# Patient Record
Sex: MALE | Race: WHITE | NOT HISPANIC OR LATINO | Employment: UNEMPLOYED | URBAN - NONMETROPOLITAN AREA
[De-identification: names, ages, dates, MRNs, and addresses within clinical notes are randomized per-mention and may not be internally consistent; named-entity substitution may affect disease eponyms.]

---

## 2024-02-24 ENCOUNTER — HOSPITAL ENCOUNTER (INPATIENT)
Facility: HOSPITAL | Age: 64
LOS: 4 days | Discharge: HOME HEALTH | DRG: 871 | End: 2024-02-28
Attending: INTERNAL MEDICINE | Admitting: INTERNAL MEDICINE
Payer: MEDICARE

## 2024-02-24 ENCOUNTER — APPOINTMENT (OUTPATIENT)
Dept: RADIOLOGY | Facility: HOSPITAL | Age: 64
DRG: 871 | End: 2024-02-24
Payer: MEDICARE

## 2024-02-24 DIAGNOSIS — J18.9 PNEUMONIA OF BOTH LUNGS DUE TO INFECTIOUS ORGANISM, UNSPECIFIED PART OF LUNG: Primary | ICD-10-CM

## 2024-02-24 DIAGNOSIS — J44.0 CHRONIC OBSTRUCTIVE PULMONARY DISEASE WITH ACUTE LOWER RESPIRATORY INFECTION (MULTI): ICD-10-CM

## 2024-02-24 DIAGNOSIS — R53.1 WEAKNESS: ICD-10-CM

## 2024-02-24 DIAGNOSIS — J21.0 RSV (ACUTE BRONCHIOLITIS DUE TO RESPIRATORY SYNCYTIAL VIRUS): ICD-10-CM

## 2024-02-24 LAB
ALBUMIN SERPL BCP-MCNC: 4 G/DL (ref 3.4–5)
ALP SERPL-CCNC: 62 U/L (ref 33–136)
ALT SERPL W P-5'-P-CCNC: 33 U/L (ref 10–52)
ANION GAP SERPL CALC-SCNC: 15 MMOL/L (ref 10–20)
AST SERPL W P-5'-P-CCNC: 29 U/L (ref 9–39)
BASOPHILS # BLD AUTO: 0.03 X10*3/UL (ref 0–0.1)
BASOPHILS NFR BLD AUTO: 0.3 %
BILIRUB SERPL-MCNC: 0.4 MG/DL (ref 0–1.2)
BUN SERPL-MCNC: 71 MG/DL (ref 6–23)
CALCIUM SERPL-MCNC: 9.5 MG/DL (ref 8.6–10.3)
CHLORIDE SERPL-SCNC: 99 MMOL/L (ref 98–107)
CO2 SERPL-SCNC: 24 MMOL/L (ref 21–32)
CREAT SERPL-MCNC: 2.92 MG/DL (ref 0.5–1.3)
EGFRCR SERPLBLD CKD-EPI 2021: 23 ML/MIN/1.73M*2
EOSINOPHIL # BLD AUTO: 0.05 X10*3/UL (ref 0–0.7)
EOSINOPHIL NFR BLD AUTO: 0.4 %
ERYTHROCYTE [DISTWIDTH] IN BLOOD BY AUTOMATED COUNT: 15.7 % (ref 11.5–14.5)
FLUAV RNA RESP QL NAA+PROBE: NOT DETECTED
FLUBV RNA RESP QL NAA+PROBE: NOT DETECTED
GLUCOSE SERPL-MCNC: 186 MG/DL (ref 74–99)
HCT VFR BLD AUTO: 43.5 % (ref 41–52)
HGB BLD-MCNC: 14 G/DL (ref 13.5–17.5)
IMM GRANULOCYTES # BLD AUTO: 0.05 X10*3/UL (ref 0–0.7)
IMM GRANULOCYTES NFR BLD AUTO: 0.4 % (ref 0–0.9)
LACTATE SERPL-SCNC: 1.8 MMOL/L (ref 0.4–2)
LYMPHOCYTES # BLD AUTO: 1.33 X10*3/UL (ref 1.2–4.8)
LYMPHOCYTES NFR BLD AUTO: 11.3 %
MAGNESIUM SERPL-MCNC: 1.76 MG/DL (ref 1.6–2.4)
MCH RBC QN AUTO: 32.1 PG (ref 26–34)
MCHC RBC AUTO-ENTMCNC: 32.2 G/DL (ref 32–36)
MCV RBC AUTO: 100 FL (ref 80–100)
MONOCYTES # BLD AUTO: 1.04 X10*3/UL (ref 0.1–1)
MONOCYTES NFR BLD AUTO: 8.9 %
MRSA DNA SPEC QL NAA+PROBE: NOT DETECTED
NEUTROPHILS # BLD AUTO: 9.24 X10*3/UL (ref 1.2–7.7)
NEUTROPHILS NFR BLD AUTO: 78.7 %
NRBC BLD-RTO: 0 /100 WBCS (ref 0–0)
PLATELET # BLD AUTO: 183 X10*3/UL (ref 150–450)
POTASSIUM SERPL-SCNC: 4.7 MMOL/L (ref 3.5–5.3)
POTASSIUM SERPL-SCNC: 5.7 MMOL/L (ref 3.5–5.3)
PROT SERPL-MCNC: 7.3 G/DL (ref 6.4–8.2)
RBC # BLD AUTO: 4.36 X10*6/UL (ref 4.5–5.9)
RSV RNA RESP QL NAA+PROBE: DETECTED
SARS-COV-2 RNA RESP QL NAA+PROBE: NOT DETECTED
SODIUM SERPL-SCNC: 132 MMOL/L (ref 136–145)
WBC # BLD AUTO: 11.7 X10*3/UL (ref 4.4–11.3)

## 2024-02-24 PROCEDURE — 94762 N-INVAS EAR/PLS OXIMTRY CONT: CPT | Mod: IPSPLIT

## 2024-02-24 PROCEDURE — 87637 SARSCOV2&INF A&B&RSV AMP PRB: CPT | Performed by: PHYSICIAN ASSISTANT

## 2024-02-24 PROCEDURE — 87640 STAPH A DNA AMP PROBE: CPT | Mod: 59 | Performed by: PHYSICIAN ASSISTANT

## 2024-02-24 PROCEDURE — 96366 THER/PROPH/DIAG IV INF ADDON: CPT

## 2024-02-24 PROCEDURE — 36415 COLL VENOUS BLD VENIPUNCTURE: CPT | Mod: IPSPLIT | Performed by: INTERNAL MEDICINE

## 2024-02-24 PROCEDURE — 2500000004 HC RX 250 GENERAL PHARMACY W/ HCPCS (ALT 636 FOR OP/ED): Performed by: PHYSICIAN ASSISTANT

## 2024-02-24 PROCEDURE — 84132 ASSAY OF SERUM POTASSIUM: CPT | Performed by: PHYSICIAN ASSISTANT

## 2024-02-24 PROCEDURE — 99285 EMERGENCY DEPT VISIT HI MDM: CPT | Mod: 25

## 2024-02-24 PROCEDURE — 96367 TX/PROPH/DG ADDL SEQ IV INF: CPT

## 2024-02-24 PROCEDURE — 2060000001 HC INTERMEDIATE ICU ROOM DAILY: Mod: IPSPLIT

## 2024-02-24 PROCEDURE — 84145 PROCALCITONIN (PCT): CPT | Mod: GENLAB | Performed by: NURSE PRACTITIONER

## 2024-02-24 PROCEDURE — 36415 COLL VENOUS BLD VENIPUNCTURE: CPT | Performed by: PHYSICIAN ASSISTANT

## 2024-02-24 PROCEDURE — 83605 ASSAY OF LACTIC ACID: CPT | Performed by: PHYSICIAN ASSISTANT

## 2024-02-24 PROCEDURE — 80053 COMPREHEN METABOLIC PANEL: CPT | Performed by: PHYSICIAN ASSISTANT

## 2024-02-24 PROCEDURE — 96365 THER/PROPH/DIAG IV INF INIT: CPT | Mod: 59

## 2024-02-24 PROCEDURE — 71250 CT THORAX DX C-: CPT

## 2024-02-24 PROCEDURE — 83735 ASSAY OF MAGNESIUM: CPT | Mod: IPSPLIT | Performed by: INTERNAL MEDICINE

## 2024-02-24 PROCEDURE — 85025 COMPLETE CBC W/AUTO DIFF WBC: CPT | Performed by: PHYSICIAN ASSISTANT

## 2024-02-24 PROCEDURE — 87040 BLOOD CULTURE FOR BACTERIA: CPT | Mod: GENLAB | Performed by: PHYSICIAN ASSISTANT

## 2024-02-24 RX ORDER — PRIMIDONE 50 MG/1
150 TABLET ORAL DAILY
COMMUNITY

## 2024-02-24 RX ORDER — MELOXICAM 15 MG/1
15 TABLET ORAL DAILY
COMMUNITY

## 2024-02-24 RX ORDER — LISINOPRIL 5 MG/1
5 TABLET ORAL DAILY
COMMUNITY

## 2024-02-24 RX ORDER — VANCOMYCIN HYDROCHLORIDE 1 G/200ML
1 INJECTION, SOLUTION INTRAVENOUS ONCE
Status: DISCONTINUED | OUTPATIENT
Start: 2024-02-25 | End: 2024-02-25

## 2024-02-24 RX ORDER — BISOPROLOL FUMARATE 5 MG/1
5 TABLET, FILM COATED ORAL DAILY
COMMUNITY

## 2024-02-24 RX ORDER — PRIMIDONE 50 MG/1
100 TABLET ORAL NIGHTLY
COMMUNITY

## 2024-02-24 RX ORDER — ERGOCALCIFEROL 1.25 MG/1
1.25 CAPSULE ORAL
COMMUNITY

## 2024-02-24 RX ORDER — IPRATROPIUM BROMIDE AND ALBUTEROL SULFATE 2.5; .5 MG/3ML; MG/3ML
3 SOLUTION RESPIRATORY (INHALATION)
Status: DISCONTINUED | OUTPATIENT
Start: 2024-02-25 | End: 2024-02-29 | Stop reason: HOSPADM

## 2024-02-24 RX ORDER — HYDROCODONE BITARTRATE AND ACETAMINOPHEN 5; 325 MG/1; MG/1
1 TABLET ORAL EVERY 6 HOURS PRN
COMMUNITY

## 2024-02-24 RX ORDER — PRIMIDONE 50 MG/1
50 TABLET ORAL DAILY
COMMUNITY

## 2024-02-24 RX ORDER — LOPERAMIDE HYDROCHLORIDE 2 MG/1
2 CAPSULE ORAL EVERY 12 HOURS PRN
COMMUNITY

## 2024-02-24 RX ORDER — ACETAMINOPHEN 325 MG/1
650 TABLET ORAL EVERY 6 HOURS PRN
Status: DISCONTINUED | OUTPATIENT
Start: 2024-02-24 | End: 2024-02-29 | Stop reason: HOSPADM

## 2024-02-24 RX ORDER — INSULIN GLARGINE 100 [IU]/ML
10 INJECTION, SOLUTION SUBCUTANEOUS 2 TIMES DAILY
COMMUNITY

## 2024-02-24 RX ORDER — SODIUM BICARBONATE 650 MG/1
650 TABLET ORAL 2 TIMES DAILY
COMMUNITY

## 2024-02-24 RX ORDER — ACETAMINOPHEN 325 MG/1
975 TABLET ORAL ONCE
Status: COMPLETED | OUTPATIENT
Start: 2024-02-24 | End: 2024-02-24

## 2024-02-24 RX ORDER — INSULIN GLARGINE 100 [IU]/ML
15 INJECTION, SOLUTION SUBCUTANEOUS DAILY
COMMUNITY

## 2024-02-24 RX ORDER — ALBUTEROL SULFATE 0.83 MG/ML
3 SOLUTION RESPIRATORY (INHALATION) EVERY 2 HOUR PRN
Status: DISCONTINUED | OUTPATIENT
Start: 2024-02-24 | End: 2024-02-29 | Stop reason: HOSPADM

## 2024-02-24 RX ORDER — LORATADINE 10 MG/1
10 TABLET ORAL DAILY PRN
COMMUNITY

## 2024-02-24 RX ORDER — SODIUM CHLORIDE 9 MG/ML
100 INJECTION, SOLUTION INTRAVENOUS CONTINUOUS
Status: DISCONTINUED | OUTPATIENT
Start: 2024-02-24 | End: 2024-02-25

## 2024-02-24 RX ORDER — FERROUS SULFATE, DRIED 160(50) MG
2 TABLET, EXTENDED RELEASE ORAL DAILY
COMMUNITY

## 2024-02-24 RX ORDER — VANCOMYCIN HYDROCHLORIDE 1 G/200ML
INJECTION, SOLUTION INTRAVENOUS
Status: DISPENSED
Start: 2024-02-24 | End: 2024-02-25

## 2024-02-24 RX ORDER — ATORVASTATIN CALCIUM 10 MG/1
10 TABLET, FILM COATED ORAL DAILY
COMMUNITY

## 2024-02-24 RX ORDER — IPRATROPIUM BROMIDE AND ALBUTEROL SULFATE 2.5; .5 MG/3ML; MG/3ML
SOLUTION RESPIRATORY (INHALATION)
Status: COMPLETED
Start: 2024-02-24 | End: 2024-02-26

## 2024-02-24 RX ORDER — ALBUTEROL SULFATE 0.83 MG/ML
3 SOLUTION RESPIRATORY (INHALATION)
Status: DISCONTINUED | OUTPATIENT
Start: 2024-02-25 | End: 2024-02-24

## 2024-02-24 RX ORDER — GABAPENTIN 300 MG/1
300 CAPSULE ORAL 2 TIMES DAILY
COMMUNITY

## 2024-02-24 RX ORDER — IPRATROPIUM BROMIDE AND ALBUTEROL SULFATE 2.5; .5 MG/3ML; MG/3ML
3 SOLUTION RESPIRATORY (INHALATION)
Status: DISCONTINUED | OUTPATIENT
Start: 2024-02-25 | End: 2024-02-24

## 2024-02-24 RX ORDER — GUAIFENESIN 600 MG/1
600 TABLET, EXTENDED RELEASE ORAL 2 TIMES DAILY
COMMUNITY

## 2024-02-24 RX ORDER — ACETAMINOPHEN 500 MG
500 TABLET ORAL EVERY 6 HOURS PRN
COMMUNITY

## 2024-02-24 RX ORDER — INSULIN LISPRO 100 [IU]/ML
INJECTION, SUSPENSION SUBCUTANEOUS
COMMUNITY

## 2024-02-24 RX ORDER — ENOXAPARIN SODIUM 100 MG/ML
30 INJECTION SUBCUTANEOUS EVERY 24 HOURS
Status: DISCONTINUED | OUTPATIENT
Start: 2024-02-24 | End: 2024-02-29 | Stop reason: HOSPADM

## 2024-02-24 RX ORDER — TIZANIDINE HYDROCHLORIDE 4 MG/1
4 CAPSULE, GELATIN COATED ORAL EVERY 12 HOURS PRN
COMMUNITY

## 2024-02-24 RX ORDER — ACETAMINOPHEN 325 MG/1
TABLET ORAL
Status: COMPLETED
Start: 2024-02-24 | End: 2024-02-24

## 2024-02-24 RX ORDER — OFLOXACIN 3 MG/ML
5 SOLUTION AURICULAR (OTIC) DAILY PRN
COMMUNITY

## 2024-02-24 RX ORDER — FUROSEMIDE 20 MG/1
20 TABLET ORAL DAILY
COMMUNITY

## 2024-02-24 RX ORDER — PANTOPRAZOLE SODIUM 20 MG/1
20 TABLET, DELAYED RELEASE ORAL
COMMUNITY

## 2024-02-24 RX ADMIN — AZITHROMYCIN MONOHYDRATE 500 MG: 500 INJECTION, POWDER, LYOPHILIZED, FOR SOLUTION INTRAVENOUS at 16:45

## 2024-02-24 RX ADMIN — SODIUM CHLORIDE 1466 ML: 9 INJECTION, SOLUTION INTRAVENOUS at 19:55

## 2024-02-24 RX ADMIN — VANCOMYCIN HYDROCHLORIDE 2 G: 1 INJECTION, POWDER, LYOPHILIZED, FOR SOLUTION INTRAVENOUS at 16:45

## 2024-02-24 RX ADMIN — ACETAMINOPHEN 975 MG: 325 TABLET ORAL at 17:00

## 2024-02-24 RX ADMIN — PIPERACILLIN SODIUM AND TAZOBACTAM SODIUM 4.5 G: 4; .5 INJECTION, SOLUTION INTRAVENOUS at 16:45

## 2024-02-24 RX ADMIN — SODIUM CHLORIDE 1000 ML: 9 INJECTION, SOLUTION INTRAVENOUS at 16:45

## 2024-02-24 ASSESSMENT — COLUMBIA-SUICIDE SEVERITY RATING SCALE - C-SSRS
6. HAVE YOU EVER DONE ANYTHING, STARTED TO DO ANYTHING, OR PREPARED TO DO ANYTHING TO END YOUR LIFE?: NO
2. HAVE YOU ACTUALLY HAD ANY THOUGHTS OF KILLING YOURSELF?: NO
1. IN THE PAST MONTH, HAVE YOU WISHED YOU WERE DEAD OR WISHED YOU COULD GO TO SLEEP AND NOT WAKE UP?: NO

## 2024-02-24 ASSESSMENT — PAIN - FUNCTIONAL ASSESSMENT
PAIN_FUNCTIONAL_ASSESSMENT: 0-10

## 2024-02-24 ASSESSMENT — PAIN SCALES - GENERAL
PAINLEVEL_OUTOF10: 0 - NO PAIN

## 2024-02-24 NOTE — ED PROVIDER NOTES
HPI   Chief Complaint   Patient presents with    Shortness of Breath       History of present illness:  64-year-old male presents to the emergency room for complaints of cough and shortness of breath.  The patient states that he is currently residing at a long-term skilled nursing facility.  He states he has a history of diabetes and hypertension as well as chronic kidney disease.  He states he has no previous heart or lung disease.  He states that he began having a bad cough a few days ago and he states yesterday began developing a fever and was sent to another emergency room and evaluated and sent home.  He states that today he got progressively worse and spoke with nursing staff when they evaluated and they are concerned about his vitals and sent him here to be evaluated for further care and treatment.  He is currently on 3 L nasal cannula oxygen at 95% but does not wear oxygen at his home facility.  Patient states he has no other symptoms at this time and denies any chest pain.  He states he does feel short of breath and feels like he cannot stop coughing.  Upon further questioning he does confirm body aches and chills though.    Social history: Negative for alcohol and drug use.    Review of systems:   Gen.: No weight loss  Eyes: No vision loss, double vision, drainage, eye pain.   ENT: No pharyngitis  Cardiac: No chest pain, palpitations, syncope, near syncope.   Pulmonary: No hemoptysis.   Heme/lymph: No swollen glands, fever, bleeding.   GI: No abdominal pain, change in bowel habits, melena, hematemesis, hematochezia, nausea, vomiting, diarrhea.   : No discharge, dysuria, frequency, urgency, hematuria.   Musculoskeletal: No limb pain, joint pain, joint swelling.   Skin: No rashes.   Review of systems is otherwise negative unless stated above or in history of present illness.      Physical exam:  General: Vitals noted  EENT: No lymphadenopathy  Cardiac: Regular, rate, rhythm, no murmur.   Pulmonary: Lungs  clear bilaterally with good aeration. No adventitious breath sounds.   Abdomen: Soft, nonsurgical. Nontender. No peritoneal signs. Normoactive bowel sounds.   Extremities: No peripheral edema.   Skin: No rash.   Neuro: No focal neurologic deficits      Medical decision making:   Testing:   Treatment:   Reevaluation:   Plan: Home-going.  Discussed differential. Will follow-up with the primary physician in the next 2-3 days. Return if worse. They understand return precautions and discharge instructions. Patient and family/friend/caregiver are in agreement with this plan. 64-year-old male presents to the emergency room for complaints of cough and shortness of breath.  The patient states that he is currently residing at a long-term skilled nursing facility.  He states he has a history of diabetes and hypertension as well as chronic kidney disease.  He states he has no previous heart or lung disease.  He states that he began having a bad cough a few days ago and he states yesterday began developing a fever and was sent to another emergency room and evaluated and sent home.  He states that today he got progressively worse and spoke with nursing staff when they evaluated and they are concerned about his vitals and sent him here to be evaluated for further care and treatment.  He is currently on 3 L nasal cannula oxygen at 95% but does not wear oxygen at his home facility.  Patient states he has no other symptoms at this time and denies any chest pain.  He states he does feel short of breath and feels like he cannot stop coughing.  Upon further questioning he does confirm body aches and chills though. Pulmonary: Lungs clear bilaterally with good aeration. No adventitious breath sounds. Cardiac: Regular, rate, rhythm, no murmur.  I explained to the patient test results my concerns for possible pneumonia and early sepsis.  The patient was agreeable to being admitted at this time for further care and treatment.  He was  admitted to the ICU at this time given his vitals as well as soft blood pressure and my concern for impending septic shock.  The patient states that he is feeling much better at this time and was agreeable to being admitted.  Impression:   1.  Pneumonia  2.  RSV          History provided by:  Patient   used: No                        [place] Coma Scale Score: 15                     Patient History   Past Medical History:   Diagnosis Date    Other specified diabetes mellitus without complications (CMS/HCC)     Diabetes mellitus of other type without complication    Personal history of other diseases of urinary system     History of kidney disease    Sensorineural hearing loss, bilateral 11/09/2021    Asymmetric SNHL (sensorineural hearing loss)     Past Surgical History:   Procedure Laterality Date    CHOLECYSTECTOMY  07/19/2018    Cholecystectomy    EYE SURGERY  07/19/2018    Eye Surgery    MR HEAD ANGIO WO IV CONTRAST  6/13/2016    MR HEAD ANGIO WO IV CONTRAST [facility]PATIENT LEGAC[facility]    MR NECK ANGIO WO IV CONTRAST  6/8/2022    MR NECK ANGIO WO IV CONTRAST 6/8/2022 [facility] ANCILLARY LEGACY    OTHER SURGICAL HISTORY  07/19/2018    Amputation Of Leg Above Knee Mid-Thigh     No family history on file.  Social History     Tobacco Use    Smoking status: Not on file    Smokeless tobacco: Not on file   Substance Use Topics    Alcohol use: Not on file    Drug use: Not on file       Physical Exam   ED Triage Vitals   Temperature Heart Rate Respirations BP   02/24/24 1639 02/24/24 1639 02/24/24 1639 02/24/24 1639   (!) 39.3 °C (102.8 °F) (!) 119 (!) 22 149/82      Pulse Ox Temp src Heart Rate Source Patient Position   02/24/24 1630 -- -- --   (!) 93 %         BP Location FiO2 (%)     -- --             Physical Exam    ED Course & MDM   ED Course as of 02/25/24 1212   Sat Feb 24, 2024   1742 POTASSIUM(!): 5.7 [KA]   2006 EKG taken at 1637 on fibber 24 2024 shows sinus tachycardia 19 left axis deviation no ST  elevation or depression no T wave inversion [JF]      ED Course User Index  [JF] Bayron Rolle PA-C  [KA] Avinash Quintero, DO         Diagnoses as of 02/25/24 1212   Pneumonia of both lungs due to infectious organism, unspecified part of lung       Medical Decision Making      Procedure  Procedures     Bayron Rolle PA-C  02/25/24 1214

## 2024-02-25 PROBLEM — E11.9 TYPE 2 DIABETES MELLITUS (MULTI): Status: ACTIVE | Noted: 2024-02-25

## 2024-02-25 PROBLEM — Z87.19 HISTORY OF GI BLEED: Status: ACTIVE | Noted: 2024-02-25

## 2024-02-25 PROBLEM — J21.0 RSV (ACUTE BRONCHIOLITIS DUE TO RESPIRATORY SYNCYTIAL VIRUS): Status: ACTIVE | Noted: 2024-02-25

## 2024-02-25 PROBLEM — N18.9 CKD (CHRONIC KIDNEY DISEASE): Status: ACTIVE | Noted: 2024-02-25

## 2024-02-25 PROBLEM — G25.0 ESSENTIAL TREMOR: Status: ACTIVE | Noted: 2024-02-25

## 2024-02-25 PROBLEM — J44.9 COPD (CHRONIC OBSTRUCTIVE PULMONARY DISEASE) (MULTI): Status: ACTIVE | Noted: 2024-02-25

## 2024-02-25 PROBLEM — D64.9 ANEMIA: Status: ACTIVE | Noted: 2024-02-25

## 2024-02-25 PROBLEM — M19.90 ARTHRITIS: Status: ACTIVE | Noted: 2024-02-25

## 2024-02-25 PROBLEM — I10 HTN (HYPERTENSION): Status: ACTIVE | Noted: 2024-02-25

## 2024-02-25 PROBLEM — E78.5 DYSLIPIDEMIA: Status: ACTIVE | Noted: 2024-02-25

## 2024-02-25 PROBLEM — G62.9 PERIPHERAL NEUROPATHY: Status: ACTIVE | Noted: 2024-02-25

## 2024-02-25 LAB
AMORPH CRY #/AREA UR COMP ASSIST: NORMAL /HPF
ANION GAP SERPL CALC-SCNC: 14 MMOL/L (ref 10–20)
APPEARANCE UR: CLEAR
BILIRUB UR STRIP.AUTO-MCNC: NEGATIVE MG/DL
BNP SERPL-MCNC: 51 PG/ML (ref 0–99)
BUN SERPL-MCNC: 64 MG/DL (ref 6–23)
CALCIUM SERPL-MCNC: 7.5 MG/DL (ref 8.6–10.3)
CHLORIDE SERPL-SCNC: 104 MMOL/L (ref 98–107)
CO2 SERPL-SCNC: 21 MMOL/L (ref 21–32)
COLOR UR: YELLOW
CREAT SERPL-MCNC: 3.03 MG/DL (ref 0.5–1.3)
EGFRCR SERPLBLD CKD-EPI 2021: 22 ML/MIN/1.73M*2
ERYTHROCYTE [DISTWIDTH] IN BLOOD BY AUTOMATED COUNT: 15.9 % (ref 11.5–14.5)
GLUCOSE BLD MANUAL STRIP-MCNC: 131 MG/DL (ref 74–99)
GLUCOSE BLD MANUAL STRIP-MCNC: 141 MG/DL (ref 74–99)
GLUCOSE BLD MANUAL STRIP-MCNC: 188 MG/DL (ref 74–99)
GLUCOSE BLD MANUAL STRIP-MCNC: 235 MG/DL (ref 74–99)
GLUCOSE BLD MANUAL STRIP-MCNC: 238 MG/DL (ref 74–99)
GLUCOSE SERPL-MCNC: 170 MG/DL (ref 74–99)
GLUCOSE UR STRIP.AUTO-MCNC: NEGATIVE MG/DL
HCT VFR BLD AUTO: 38.1 % (ref 41–52)
HGB BLD-MCNC: 11.9 G/DL (ref 13.5–17.5)
HOLD SPECIMEN: NORMAL
KETONES UR STRIP.AUTO-MCNC: NEGATIVE MG/DL
LACTATE SERPL-SCNC: 0.8 MMOL/L (ref 0.4–2)
LEUKOCYTE ESTERASE UR QL STRIP.AUTO: NEGATIVE
MCH RBC QN AUTO: 31.9 PG (ref 26–34)
MCHC RBC AUTO-ENTMCNC: 31.2 G/DL (ref 32–36)
MCV RBC AUTO: 102 FL (ref 80–100)
NITRITE UR QL STRIP.AUTO: NEGATIVE
NRBC BLD-RTO: 0 /100 WBCS (ref 0–0)
PH UR STRIP.AUTO: 5 [PH]
PLATELET # BLD AUTO: 155 X10*3/UL (ref 150–450)
POTASSIUM SERPL-SCNC: 4.3 MMOL/L (ref 3.5–5.3)
PROCALCITONIN SERPL-MCNC: 1.25 NG/ML
PROT UR STRIP.AUTO-MCNC: ABNORMAL MG/DL
RBC # BLD AUTO: 3.73 X10*6/UL (ref 4.5–5.9)
RBC # UR STRIP.AUTO: ABNORMAL /UL
RBC #/AREA URNS AUTO: NORMAL /HPF
SODIUM SERPL-SCNC: 135 MMOL/L (ref 136–145)
SP GR UR STRIP.AUTO: 1.01
UROBILINOGEN UR STRIP.AUTO-MCNC: <2 MG/DL
VANCOMYCIN SERPL-MCNC: 18.3 UG/ML (ref 5–20)
WBC # BLD AUTO: 8.2 X10*3/UL (ref 4.4–11.3)
WBC #/AREA URNS AUTO: NORMAL /HPF

## 2024-02-25 PROCEDURE — 83880 ASSAY OF NATRIURETIC PEPTIDE: CPT | Mod: IPSPLIT | Performed by: INTERNAL MEDICINE

## 2024-02-25 PROCEDURE — 2500000001 HC RX 250 WO HCPCS SELF ADMINISTERED DRUGS (ALT 637 FOR MEDICARE OP): Mod: IPSPLIT | Performed by: NURSE PRACTITIONER

## 2024-02-25 PROCEDURE — 36415 COLL VENOUS BLD VENIPUNCTURE: CPT | Mod: IPSPLIT | Performed by: NURSE PRACTITIONER

## 2024-02-25 PROCEDURE — 2500000002 HC RX 250 W HCPCS SELF ADMINISTERED DRUGS (ALT 637 FOR MEDICARE OP, ALT 636 FOR OP/ED): Mod: IPSPLIT | Performed by: NURSE PRACTITIONER

## 2024-02-25 PROCEDURE — 83605 ASSAY OF LACTIC ACID: CPT | Mod: IPSPLIT | Performed by: NURSE PRACTITIONER

## 2024-02-25 PROCEDURE — 94640 AIRWAY INHALATION TREATMENT: CPT | Mod: IPSPLIT

## 2024-02-25 PROCEDURE — 87449 NOS EACH ORGANISM AG IA: CPT | Mod: GENLAB | Performed by: NURSE PRACTITIONER

## 2024-02-25 PROCEDURE — 80202 ASSAY OF VANCOMYCIN: CPT | Mod: IPSPLIT | Performed by: NURSE PRACTITIONER

## 2024-02-25 PROCEDURE — 99223 1ST HOSP IP/OBS HIGH 75: CPT | Performed by: NURSE PRACTITIONER

## 2024-02-25 PROCEDURE — 2500000004 HC RX 250 GENERAL PHARMACY W/ HCPCS (ALT 636 FOR OP/ED): Mod: IPSPLIT | Performed by: NURSE PRACTITIONER

## 2024-02-25 PROCEDURE — 85027 COMPLETE CBC AUTOMATED: CPT | Mod: IPSPLIT | Performed by: NURSE PRACTITIONER

## 2024-02-25 PROCEDURE — 1200000002 HC GENERAL ROOM WITH TELEMETRY DAILY: Mod: IPSPLIT

## 2024-02-25 PROCEDURE — 94664 DEMO&/EVAL PT USE INHALER: CPT | Mod: IPSPLIT

## 2024-02-25 PROCEDURE — 82947 ASSAY GLUCOSE BLOOD QUANT: CPT | Mod: IPSPLIT

## 2024-02-25 PROCEDURE — 81001 URINALYSIS AUTO W/SCOPE: CPT | Mod: IPSPLIT | Performed by: PHYSICIAN ASSISTANT

## 2024-02-25 PROCEDURE — 2500000005 HC RX 250 GENERAL PHARMACY W/O HCPCS: Mod: IPSPLIT | Performed by: NURSE PRACTITIONER

## 2024-02-25 PROCEDURE — 82374 ASSAY BLOOD CARBON DIOXIDE: CPT | Mod: IPSPLIT | Performed by: NURSE PRACTITIONER

## 2024-02-25 RX ORDER — PRIMIDONE 50 MG/1
50 TABLET ORAL 4 TIMES DAILY
Status: DISCONTINUED | OUTPATIENT
Start: 2024-02-25 | End: 2024-02-25

## 2024-02-25 RX ORDER — SODIUM BICARBONATE 650 MG/1
650 TABLET ORAL 2 TIMES DAILY
Status: DISCONTINUED | OUTPATIENT
Start: 2024-02-25 | End: 2024-02-29 | Stop reason: HOSPADM

## 2024-02-25 RX ORDER — MELOXICAM 7.5 MG/1
15 TABLET ORAL DAILY PRN
Status: DISCONTINUED | OUTPATIENT
Start: 2024-02-25 | End: 2024-02-29 | Stop reason: HOSPADM

## 2024-02-25 RX ORDER — HYDROCODONE BITARTRATE AND ACETAMINOPHEN 5; 325 MG/1; MG/1
1 TABLET ORAL EVERY 6 HOURS PRN
Status: DISCONTINUED | OUTPATIENT
Start: 2024-02-25 | End: 2024-02-29 | Stop reason: HOSPADM

## 2024-02-25 RX ORDER — DEXTROSE 50 % IN WATER (D50W) INTRAVENOUS SYRINGE
25
Status: DISCONTINUED | OUTPATIENT
Start: 2024-02-25 | End: 2024-02-29 | Stop reason: HOSPADM

## 2024-02-25 RX ORDER — CEFTRIAXONE 1 G/50ML
1 INJECTION, SOLUTION INTRAVENOUS EVERY 24 HOURS
Status: DISCONTINUED | OUTPATIENT
Start: 2024-02-25 | End: 2024-02-27

## 2024-02-25 RX ORDER — DICLOFENAC SODIUM 10 MG/G
4 GEL TOPICAL 2 TIMES DAILY PRN
Status: DISCONTINUED | OUTPATIENT
Start: 2024-02-25 | End: 2024-02-29 | Stop reason: HOSPADM

## 2024-02-25 RX ORDER — PRIMIDONE 50 MG/1
50 TABLET ORAL
Status: DISCONTINUED | OUTPATIENT
Start: 2024-02-26 | End: 2024-02-29 | Stop reason: HOSPADM

## 2024-02-25 RX ORDER — INSULIN LISPRO 100 [IU]/ML
0-10 INJECTION, SOLUTION INTRAVENOUS; SUBCUTANEOUS
Status: DISCONTINUED | OUTPATIENT
Start: 2024-02-25 | End: 2024-02-29 | Stop reason: HOSPADM

## 2024-02-25 RX ORDER — TIZANIDINE 4 MG/1
4 TABLET ORAL EVERY 12 HOURS PRN
Status: DISCONTINUED | OUTPATIENT
Start: 2024-02-25 | End: 2024-02-29 | Stop reason: HOSPADM

## 2024-02-25 RX ORDER — PRIMIDONE 50 MG/1
150 TABLET ORAL DAILY
Status: DISCONTINUED | OUTPATIENT
Start: 2024-02-26 | End: 2024-02-29 | Stop reason: HOSPADM

## 2024-02-25 RX ORDER — FUROSEMIDE 20 MG/1
20 TABLET ORAL DAILY
Status: DISCONTINUED | OUTPATIENT
Start: 2024-02-25 | End: 2024-02-29 | Stop reason: HOSPADM

## 2024-02-25 RX ORDER — LISINOPRIL 2.5 MG/1
5 TABLET ORAL DAILY
Status: DISCONTINUED | OUTPATIENT
Start: 2024-02-25 | End: 2024-02-29 | Stop reason: HOSPADM

## 2024-02-25 RX ORDER — BISOPROLOL FUMARATE 5 MG/1
5 TABLET, FILM COATED ORAL DAILY
Status: DISCONTINUED | OUTPATIENT
Start: 2024-02-25 | End: 2024-02-29 | Stop reason: HOSPADM

## 2024-02-25 RX ORDER — GABAPENTIN 300 MG/1
300 CAPSULE ORAL 2 TIMES DAILY
Status: DISCONTINUED | OUTPATIENT
Start: 2024-02-25 | End: 2024-02-29 | Stop reason: HOSPADM

## 2024-02-25 RX ORDER — ERGOCALCIFEROL 1.25 MG/1
1250 CAPSULE ORAL
Status: DISCONTINUED | OUTPATIENT
Start: 2024-02-25 | End: 2024-02-29 | Stop reason: HOSPADM

## 2024-02-25 RX ORDER — AZITHROMYCIN 250 MG/1
500 TABLET, FILM COATED ORAL
Status: COMPLETED | OUTPATIENT
Start: 2024-02-25 | End: 2024-02-27

## 2024-02-25 RX ORDER — MELOXICAM 7.5 MG/1
15 TABLET ORAL DAILY
Status: DISCONTINUED | OUTPATIENT
Start: 2024-02-25 | End: 2024-02-25

## 2024-02-25 RX ORDER — PRIMIDONE 50 MG/1
100 TABLET ORAL NIGHTLY
Status: DISCONTINUED | OUTPATIENT
Start: 2024-02-25 | End: 2024-02-29 | Stop reason: HOSPADM

## 2024-02-25 RX ORDER — LORATADINE 10 MG/1
10 TABLET ORAL DAILY PRN
Status: DISCONTINUED | OUTPATIENT
Start: 2024-02-25 | End: 2024-02-29 | Stop reason: HOSPADM

## 2024-02-25 RX ORDER — PANTOPRAZOLE SODIUM 20 MG/1
20 TABLET, DELAYED RELEASE ORAL
Status: DISCONTINUED | OUTPATIENT
Start: 2024-02-25 | End: 2024-02-29 | Stop reason: HOSPADM

## 2024-02-25 RX ORDER — DEXTROSE MONOHYDRATE 100 MG/ML
0.3 INJECTION, SOLUTION INTRAVENOUS ONCE AS NEEDED
Status: DISCONTINUED | OUTPATIENT
Start: 2024-02-25 | End: 2024-02-29 | Stop reason: HOSPADM

## 2024-02-25 RX ORDER — INSULIN GLARGINE 100 [IU]/ML
10 INJECTION, SOLUTION SUBCUTANEOUS 2 TIMES DAILY
Status: DISCONTINUED | OUTPATIENT
Start: 2024-02-25 | End: 2024-02-29 | Stop reason: HOSPADM

## 2024-02-25 RX ORDER — ATORVASTATIN CALCIUM 10 MG/1
10 TABLET, FILM COATED ORAL DAILY
Status: DISCONTINUED | OUTPATIENT
Start: 2024-02-25 | End: 2024-02-29 | Stop reason: HOSPADM

## 2024-02-25 RX ORDER — GUAIFENESIN 600 MG/1
600 TABLET, EXTENDED RELEASE ORAL 2 TIMES DAILY
Status: DISCONTINUED | OUTPATIENT
Start: 2024-02-25 | End: 2024-02-29 | Stop reason: HOSPADM

## 2024-02-25 RX ADMIN — ENOXAPARIN SODIUM 30 MG: 30 INJECTION SUBCUTANEOUS at 00:08

## 2024-02-25 RX ADMIN — HYDROCODONE BITARTRATE AND ACETAMINOPHEN 1 TABLET: 5; 325 TABLET ORAL at 06:22

## 2024-02-25 RX ADMIN — PRIMIDONE 100 MG: 50 TABLET ORAL at 20:43

## 2024-02-25 RX ADMIN — IPRATROPIUM BROMIDE AND ALBUTEROL SULFATE 3 ML: 2.5; .5 SOLUTION RESPIRATORY (INHALATION) at 08:41

## 2024-02-25 RX ADMIN — SODIUM BICARBONATE 650 MG TABLET 650 MG: at 20:43

## 2024-02-25 RX ADMIN — INSULIN LISPRO 2 UNITS: 100 INJECTION, SOLUTION INTRAVENOUS; SUBCUTANEOUS at 17:07

## 2024-02-25 RX ADMIN — ACETAMINOPHEN 650 MG: 325 TABLET ORAL at 11:07

## 2024-02-25 RX ADMIN — FUROSEMIDE 20 MG: 20 TABLET ORAL at 09:33

## 2024-02-25 RX ADMIN — SODIUM BICARBONATE 650 MG TABLET 650 MG: at 10:58

## 2024-02-25 RX ADMIN — GUAIFENESIN 600 MG: 600 TABLET, EXTENDED RELEASE ORAL at 20:43

## 2024-02-25 RX ADMIN — INSULIN GLARGINE 10 UNITS: 100 INJECTION, SOLUTION SUBCUTANEOUS at 20:46

## 2024-02-25 RX ADMIN — PIPERACILLIN SODIUM AND TAZOBACTAM SODIUM 3.38 G: 3; .375 INJECTION, SOLUTION INTRAVENOUS at 00:08

## 2024-02-25 RX ADMIN — INSULIN LISPRO 4 UNITS: 100 INJECTION, SOLUTION INTRAVENOUS; SUBCUTANEOUS at 11:26

## 2024-02-25 RX ADMIN — Medication 2 L/MIN: at 09:14

## 2024-02-25 RX ADMIN — MELOXICAM 15 MG: 7.5 TABLET ORAL at 22:40

## 2024-02-25 RX ADMIN — CEFTRIAXONE 1 G: 1 INJECTION, SOLUTION INTRAVENOUS at 09:33

## 2024-02-25 RX ADMIN — GABAPENTIN 300 MG: 300 CAPSULE ORAL at 09:33

## 2024-02-25 RX ADMIN — SODIUM CHLORIDE 100 ML/HR: 9 INJECTION, SOLUTION INTRAVENOUS at 00:08

## 2024-02-25 RX ADMIN — PIPERACILLIN SODIUM AND TAZOBACTAM SODIUM 3.38 G: 3; .375 INJECTION, SOLUTION INTRAVENOUS at 05:13

## 2024-02-25 RX ADMIN — DICLOFENAC SODIUM 4 G: 10 GEL TOPICAL at 10:58

## 2024-02-25 RX ADMIN — ATORVASTATIN CALCIUM 10 MG: 10 TABLET, FILM COATED ORAL at 09:33

## 2024-02-25 RX ADMIN — GABAPENTIN 300 MG: 300 CAPSULE ORAL at 20:43

## 2024-02-25 RX ADMIN — PRIMIDONE 50 MG: 50 TABLET ORAL at 17:07

## 2024-02-25 RX ADMIN — BISOPROLOL FUMARATE 5 MG: 5 TABLET ORAL at 09:33

## 2024-02-25 RX ADMIN — GUAIFENESIN 600 MG: 600 TABLET, EXTENDED RELEASE ORAL at 09:33

## 2024-02-25 RX ADMIN — PANTOPRAZOLE SODIUM 20 MG: 20 TABLET, DELAYED RELEASE ORAL at 06:20

## 2024-02-25 RX ADMIN — PRIMIDONE 50 MG: 50 TABLET ORAL at 13:33

## 2024-02-25 RX ADMIN — IPRATROPIUM BROMIDE AND ALBUTEROL SULFATE 3 ML: 2.5; .5 SOLUTION RESPIRATORY (INHALATION) at 21:02

## 2024-02-25 RX ADMIN — ACETAMINOPHEN 650 MG: 325 TABLET ORAL at 02:33

## 2024-02-25 RX ADMIN — LISINOPRIL 5 MG: 2.5 TABLET ORAL at 09:33

## 2024-02-25 RX ADMIN — PRIMIDONE 50 MG: 50 TABLET ORAL at 06:20

## 2024-02-25 RX ADMIN — AZITHROMYCIN DIHYDRATE 500 MG: 250 TABLET ORAL at 09:33

## 2024-02-25 RX ADMIN — IPRATROPIUM BROMIDE AND ALBUTEROL SULFATE 3 ML: 2.5; .5 SOLUTION RESPIRATORY (INHALATION) at 16:06

## 2024-02-25 RX ADMIN — HYDROCODONE BITARTRATE AND ACETAMINOPHEN 1 TABLET: 5; 325 TABLET ORAL at 22:07

## 2024-02-25 RX ADMIN — ENOXAPARIN SODIUM 30 MG: 30 INJECTION SUBCUTANEOUS at 21:54

## 2024-02-25 RX ADMIN — INSULIN GLARGINE 10 UNITS: 100 INJECTION, SOLUTION SUBCUTANEOUS at 09:33

## 2024-02-25 RX ADMIN — TIZANIDINE 4 MG: 4 TABLET ORAL at 11:32

## 2024-02-25 SDOH — SOCIAL STABILITY: SOCIAL INSECURITY: DO YOU FEEL ANYONE HAS EXPLOITED OR TAKEN ADVANTAGE OF YOU FINANCIALLY OR OF YOUR PERSONAL PROPERTY?: NO

## 2024-02-25 SDOH — SOCIAL STABILITY: SOCIAL INSECURITY: HAVE YOU HAD THOUGHTS OF HARMING ANYONE ELSE?: NO

## 2024-02-25 SDOH — SOCIAL STABILITY: SOCIAL INSECURITY: WERE YOU ABLE TO COMPLETE ALL THE BEHAVIORAL HEALTH SCREENINGS?: YES

## 2024-02-25 SDOH — SOCIAL STABILITY: SOCIAL INSECURITY: ARE THERE ANY APPARENT SIGNS OF INJURIES/BEHAVIORS THAT COULD BE RELATED TO ABUSE/NEGLECT?: NO

## 2024-02-25 SDOH — SOCIAL STABILITY: SOCIAL INSECURITY: ABUSE: ADULT

## 2024-02-25 SDOH — SOCIAL STABILITY: SOCIAL INSECURITY: ARE YOU OR HAVE YOU BEEN THREATENED OR ABUSED PHYSICALLY, EMOTIONALLY, OR SEXUALLY BY ANYONE?: NO

## 2024-02-25 SDOH — SOCIAL STABILITY: SOCIAL INSECURITY: DO YOU FEEL UNSAFE GOING BACK TO THE PLACE WHERE YOU ARE LIVING?: NO

## 2024-02-25 SDOH — SOCIAL STABILITY: SOCIAL INSECURITY: HAS ANYONE EVER THREATENED TO HURT YOUR FAMILY OR YOUR PETS?: NO

## 2024-02-25 SDOH — SOCIAL STABILITY: SOCIAL INSECURITY: DOES ANYONE TRY TO KEEP YOU FROM HAVING/CONTACTING OTHER FRIENDS OR DOING THINGS OUTSIDE YOUR HOME?: NO

## 2024-02-25 ASSESSMENT — ACTIVITIES OF DAILY LIVING (ADL)
BATHING: NEEDS ASSISTANCE
FEEDING YOURSELF: INDEPENDENT
DRESSING YOURSELF: NEEDS ASSISTANCE
TOILETING: NEEDS ASSISTANCE
JUDGMENT_ADEQUATE_SAFELY_COMPLETE_DAILY_ACTIVITIES: YES
HEARING - RIGHT EAR: FUNCTIONAL
PATIENT'S MEMORY ADEQUATE TO SAFELY COMPLETE DAILY ACTIVITIES?: YES
WALKS IN HOME: NEEDS ASSISTANCE
GROOMING: NEEDS ASSISTANCE
LACK_OF_TRANSPORTATION: PATIENT DECLINED
ADEQUATE_TO_COMPLETE_ADL: YES
HEARING - LEFT EAR: FUNCTIONAL

## 2024-02-25 ASSESSMENT — ENCOUNTER SYMPTOMS
HEMATOLOGIC/LYMPHATIC NEGATIVE: 1
APPETITE CHANGE: 1
ALLERGIC/IMMUNOLOGIC NEGATIVE: 1
PSYCHIATRIC NEGATIVE: 1
GASTROINTESTINAL NEGATIVE: 1
EYES NEGATIVE: 1
SHORTNESS OF BREATH: 1
FEVER: 1
CARDIOVASCULAR NEGATIVE: 1
CHILLS: 1
ENDOCRINE NEGATIVE: 1
MUSCULOSKELETAL NEGATIVE: 1
NEUROLOGICAL NEGATIVE: 1
COUGH: 1
FATIGUE: 1

## 2024-02-25 ASSESSMENT — COGNITIVE AND FUNCTIONAL STATUS - GENERAL
EATING MEALS: A LITTLE
STANDING UP FROM CHAIR USING ARMS: A LOT
DRESSING REGULAR LOWER BODY CLOTHING: A LOT
TOILETING: A LITTLE
CLIMB 3 TO 5 STEPS WITH RAILING: TOTAL
DAILY ACTIVITIY SCORE: 15
TURNING FROM BACK TO SIDE WHILE IN FLAT BAD: A LOT
MOVING TO AND FROM BED TO CHAIR: A LOT
DRESSING REGULAR UPPER BODY CLOTHING: A LOT
HELP NEEDED FOR BATHING: A LOT
PERSONAL GROOMING: A LITTLE
PATIENT BASELINE BEDBOUND: NO
MOBILITY SCORE: 11
WALKING IN HOSPITAL ROOM: TOTAL
MOVING FROM LYING ON BACK TO SITTING ON SIDE OF FLAT BED WITH BEDRAILS: A LITTLE

## 2024-02-25 ASSESSMENT — PAIN SCALES - GENERAL
PAINLEVEL_OUTOF10: 6
PAINLEVEL_OUTOF10: 0 - NO PAIN
PAINLEVEL_OUTOF10: 3
PAINLEVEL_OUTOF10: 0 - NO PAIN
PAINLEVEL_OUTOF10: 3
PAINLEVEL_OUTOF10: 0 - NO PAIN
PAINLEVEL_OUTOF10: 0 - NO PAIN
PAINLEVEL_OUTOF10: 3
PAINLEVEL_OUTOF10: 3
PAINLEVEL_OUTOF10: 0 - NO PAIN
PAINLEVEL_OUTOF10: 3
PAINLEVEL_OUTOF10: 0 - NO PAIN
PAINLEVEL_OUTOF10: 0 - NO PAIN

## 2024-02-25 ASSESSMENT — PAIN DESCRIPTION - LOCATION
LOCATION: LEG

## 2024-02-25 ASSESSMENT — PATIENT HEALTH QUESTIONNAIRE - PHQ9
1. LITTLE INTEREST OR PLEASURE IN DOING THINGS: NOT AT ALL
2. FEELING DOWN, DEPRESSED OR HOPELESS: NOT AT ALL
SUM OF ALL RESPONSES TO PHQ9 QUESTIONS 1 & 2: 0

## 2024-02-25 ASSESSMENT — LIFESTYLE VARIABLES
HOW MANY STANDARD DRINKS CONTAINING ALCOHOL DO YOU HAVE ON A TYPICAL DAY: PATIENT DOES NOT DRINK
SKIP TO QUESTIONS 9-10: 1
HOW OFTEN DO YOU HAVE A DRINK CONTAINING ALCOHOL: NEVER
HOW OFTEN DO YOU HAVE 6 OR MORE DRINKS ON ONE OCCASION: NEVER
AUDIT-C TOTAL SCORE: 0
AUDIT-C TOTAL SCORE: 0

## 2024-02-25 ASSESSMENT — PAIN DESCRIPTION - ORIENTATION
ORIENTATION: LEFT

## 2024-02-25 NOTE — CONSULTS
CRITICAL CARE PROGRESS NOTE      Hospital Day # 1    Jarret Bledsoe  Primary Care Physician: Trish Echavarria DO  Primary Pulmonologist:      Subjective/Last 24 Hour Update   Mr. Bledsoe is a 64 old male with a pmhx of DM II who presents from a nursing home for with SOB secondary to RSV   Infection.     Patient when he  on arrival was mid 90s     Lines- PIVS  Tubes - none   Gtts - none       Hospital Course     64-year-old male presents to the emergency room for complaints of cough and shortness of breath. The patient states that he is currently residing at a long-term skilled nursing facility. He states he has a history of diabetes and hypertension as well as chronic kidney disease. He states he has no previous heart or lung disease. He states that he began having a bad cough a few days ago and he states yesterday began developing a fever and was sent to another emergency room and evaluated and sent home. He states that today he got progressively worse and spoke with nursing staff when they evaluated and they are concerned about his vitals and sent him here to be evaluated for further care and treatment. He is currently on 3 L nasal cannula oxygen at 95% but does not wear oxygen at his home facility. Patient states he has no other symptoms at this time and denies any chest pain. He states he does feel short of breath and feels like he cannot stop coughing. Upon further questioning he does confirm body aches and chills though.         Past history: reviewed as below    Past Medical History:   Diagnosis Date    Other specified diabetes mellitus without complications (CMS/[facility])     Diabetes mellitus of other type without complication    Personal history of other diseases of urinary system     History of kidney disease    Sensorineural hearing loss, bilateral 11/09/2021    Asymmetric SNHL (sensorineural hearing loss)     Past Surgical History:   Procedure Laterality Date    CHOLECYSTECTOMY  07/19/2018     Cholecystectomy    EYE SURGERY  2018    Eye Surgery    MR HEAD ANGIO WO IV CONTRAST  2016    MR HEAD ANGIO WO IV CONTRAST [facility]IENT LEGAC[facility]    MR NECK ANGIO WO IV CONTRAST  2022    MR NECK ANGIO WO IV CONTRAST 2022 [facility] ANCILLARY LEGACY    OTHER SURGICAL HISTORY  2018    Amputation Of Leg Above Knee Mid-Thigh     Social History     Socioeconomic History    Marital status:      Spouse name: None    Number of children: None    Years of education: None    Highest education level: None   Occupational History    None   Tobacco Use    Smoking status: Unknown    Smokeless tobacco: None   Substance and Sexual Activity    Alcohol use: None    Drug use: None    Sexual activity: None   Other Topics Concern    None   Social History Narrative    None     Social Determinants of Health     Financial Resource Strain: Patient Declined (2024)    Overall Financial Resource Strain (CARDIA)     Difficulty of Paying Living Expenses: Patient declined   Food Insecurity: Not on file   Transportation Needs: Patient Declined (2024)    PRAPARE - Transportation     Lack of Transportation (Medical): Patient declined     Lack of Transportation (Non-Medical): Patient declined   Physical Activity: Not on file   Stress: Not on file   Social Connections: Not on file   Intimate Partner Violence: Not on file   Housing Stability: Patient Declined (2024)    Housing Stability Vital Sign     Unable to Pay for Housing in the Last Year: Patient declined     Number of Places Lived in the Last Year: 2     Unstable Housing in the Last Year: Patient declined     No family history on file.        Objective         Vitals for last 24 hours Temperature Ins/Outs Weight   Temp:  [36.3 °C (97.3 °F)-39.3 °C (102.8 °F)] 37.3 °C (99.1 °F)  Heart Rate:  [] 109  Resp:  [19-32] 28  BP: ()/(48-83) 122/56  FiO2 (%):  [35 %] 35 % Temp (24hrs), Av.8 °C (100.1 °F), Min:36.3 °C (97.3 °F), Max:39.3 °C (102.8 °F)    "  Intake/Output Summary (Last 24 hours) at 2/25/2024 0241  Last data filed at 2/24/2024 2200  Gross per 24 hour   Intake --   Output 500 ml   Net -500 ml    Wt Readings from Last 7 Encounters:   02/24/24 84.5 kg (186 lb 4.6 oz)   04/25/22 86.2 kg (190 lb)   11/09/21 86.2 kg (190 lb)   09/21/21 86.6 kg (191 lb)   09/15/21 86.5 kg (190 lb 9.6 oz)   08/30/21 86.8 kg (191 lb 6.4 oz)   07/20/21 86.5 kg (190 lb 12.8 oz)    Body mass index is 29.18 kg/m².   Sats Hemodynamics Cumulative I/O Vent Settings   SpO2 Readings from Last 3 Encounters:   02/25/24 94%   04/25/22 98%   09/15/21 99%          [unfilled]   @[email or web address]@     Exam:    Gen: NAD  HEENT: NCAT  CV:tachycardia   Resp: wheezing on the bases   Abd: S, NT, ND  Ext: WWP, no significant peripheral edema noted.  Neuro: No focal deficits identified.      Drains         Medications         Scheduled Meds:enoxaparin, 30 mg, subcutaneous, q24h  ipratropium-albuteroL, 3 mL, nebulization, TID  ipratropium-albuteroL, , ,   piperacillin-tazobactam, 3.375 g, intravenous, q6h  vancomycin, 1 g, intravenous, Once  vancomycin, , ,       Continuous Infusions:sodium chloride 0.9%, 100 mL/hr, Last Rate: 100 mL/hr (02/25/24 0008)          Laboratory Data     ID/Cultures:    Date Result Date  Result   Blood 2/24 Process      Respiratory       Urine       C. Diff       Flu/RSV/COVID 2/24 + RSV      Other    No results found for: \"HIV1X2\"  No results found for: \"KJ4CFYKX\"       Hematology  Results from last 7 days   Lab Units 02/24/24  1650   WBC AUTO x10*3/uL 11.7*   RBC AUTO x10*6/uL 4.36*   HEMOGLOBIN g/dL 14.0   HEMATOCRIT % 43.5   PLATELETS AUTO x10*3/uL 183       Chemistry         Results from last 7 days   Lab Units 02/24/24  1847 02/24/24  1650   SODIUM mmol/L  --  132*   POTASSIUM mmol/L 4.7 5.7*   CHLORIDE mmol/L  --  99   CO2 mmol/L  --  24   BUN mg/dL  --  71*   CREATININE mg/dL  --  2.92*   CALCIUM mg/dL  --  9.5   ALBUMIN g/dL  --  4.0   PROTEIN TOTAL g/dL  --  " "7.3   BILIRUBIN TOTAL mg/dL  --  0.4   ALT U/L  --  33   AST U/L  --  29     Results from last 7 days   Lab Units 02/24/24  1650   LACTATE mmol/L 1.8     No lab exists for component: \"SCVO2\"      Blood Gases        No lab exists for component: \"ERS7CNSCA\", \"IP8KGBTI\", \"BEDEFICIT\"       Assessment/Plan       Acute hypoxic respiratory failure secondary RSV   Acute on chronic kidney   COPD   Hyperkalemia       - will give ICS   - support care for RSV   - patient is on 3 L   - will order a BNP in the am   - likely GGOs are viral vs bacterial   - Will continue to manage and replete critical electrolytes. Will keep  Mg >2.0, K >4.0, and Phos greater than 3.0.  - Will actively target with vasopressors , if needed, to keep MAP > 65.  - Appropriately maintain saturations above 92%.  - Continue to monitor UOP carefully to ensure at least 0.5ml/kg/h.  - Maintain appropriate sleep/wake cycles to avoid ICU delirium.    DVT prophylaxis: lovenox   Code status: Full        The entirety of this visit history, physical exam, and diagnostic interpretation was done via audiovisual telemedicine.     Patient is located in [place] and I am located in [place].   Upon my own and separate evaluation, this patient had a high  probability of imminent of life-threatening deterioration due to acute hypoxic respiratory failure ,  which required my direct attention, intervention and personal  management.    I have personally provided 60 minutes of critical care time exclusive  of time spent on separately billable procedures. Time includes review  of laboratory data, radiology results, discussion with consultants,  and monitoring of potential decompensation. Interventions were  performed as documented above.    Dimas Beauchamp IV, MD  Pulmonary, Critical Care and Sleep Medicine  [facility]      "

## 2024-02-25 NOTE — CARE PLAN
The patient's goals for the shift include  : to have decreased shortness of breath     The clinical goals for the shift include SPO2 will be 92% or greater on supplemental O2    Patient's HR improving throughout shift. Medicated for left leg pain, see MAR. Patient on 3 L NC. Droplet precautions in place.

## 2024-02-25 NOTE — H&P
History Of Present Illness  Jarret Bledsoe is a 64 y.o. male with PMH of diabetes, HTN, CKD who presented to ED 2/24 from SNF with c/o cough, fever, body aches, chills and SOB for a few days. He has been hypoxic as well for the past few days and needed oxygen support, 3 liters NC. On arrival to the ED he was febrile to 39.3 degrees, tachycardic with , and 93% on 3 liters. Further workup showed Na 132, K 5.7, Bun/creat 71/2.92, WBC 11.7. He was also found to be RSV+. CT chest showed basilar ground glass opacities, basilar bronchiectasis and bronchial wall thickening suggesting acute bronchitis. He was given IVF, IV antibiotics were started and he was admitted to Medicine for further evaluation and treatment.     Past Medical History  Past Medical History:   Diagnosis Date    Anemia     Arthritis     Chronic kidney disease     COPD (chronic obstructive pulmonary disease) (CMS/[facility])     Diabetes mellitus (CMS/[facility])     Dyslipidemia     Hypertension     Sensorineural hearing loss, bilateral 11/09/2021    Asymmetric SNHL (sensorineural hearing loss)    Sickle cell anemia (CMS/[facility])      Surgical History  Past Surgical History:   Procedure Laterality Date    AV FISTULA PLACEMENT      CHOLECYSTECTOMY  07/19/2018    Cholecystectomy    EYE SURGERY  07/19/2018    Eye Surgery    HERNIA REPAIR      LEG AMPUTATION THROUGH KNEE Right     MR HEAD ANGIO WO IV CONTRAST  06/13/2016    MR HEAD ANGIO WO IV CONTRAST [facility] INPATIENT LEGACY    MR NECK ANGIO WO IV CONTRAST  06/08/2022    MR NECK ANGIO WO IV CONTRAST 6/8/2022 [facility] ANCILLARY LEGACY      Social History  He has no history on file for tobacco use, alcohol use, and drug use.    Family History  No family history on file.     Allergies  Patient has no known allergies.    Review of Systems   Constitutional:  Positive for appetite change, chills, fatigue and fever.   HENT: Negative.     Eyes: Negative.    Respiratory:  Positive for cough and shortness of breath.    Cardiovascular:  Negative.    Gastrointestinal: Negative.    Endocrine: Negative.    Genitourinary: Negative.    Musculoskeletal: Negative.    Skin: Negative.    Allergic/Immunologic: Negative.    Neurological: Negative.    Hematological: Negative.    Psychiatric/Behavioral: Negative.          Physical Exam  Constitutional:       General: He is not in acute distress.     Appearance: Normal appearance. He is not toxic-appearing.   HENT:      Head: Normocephalic and atraumatic.      Mouth/Throat:      Mouth: Mucous membranes are moist.   Eyes:      Extraocular Movements: Extraocular movements intact.      Pupils: Pupils are equal, round, and reactive to light.   Cardiovascular:      Rate and Rhythm: Normal rate and regular rhythm.      Pulses: Normal pulses.      Heart sounds: Normal heart sounds. No murmur heard.     No gallop.   Pulmonary:      Effort: Pulmonary effort is normal. No respiratory distress.      Breath sounds: Wheezing and rhonchi present. No rales.   Abdominal:      General: Bowel sounds are normal. There is no distension.      Palpations: Abdomen is soft.      Tenderness: There is no abdominal tenderness. There is no guarding.   Musculoskeletal:         General: No swelling or tenderness. Normal range of motion.      Cervical back: Normal range of motion and neck supple.      Left lower leg: No edema.   Skin:     General: Skin is warm and dry.      Capillary Refill: Capillary refill takes less than 2 seconds.      Coloration: Skin is not jaundiced.      Findings: No bruising or rash.   Neurological:      General: No focal deficit present.      Mental Status: He is alert and oriented to person, place, and time.      Cranial Nerves: No cranial nerve deficit.      Sensory: No sensory deficit.      Motor: No weakness.      Gait: Gait normal.   Psychiatric:         Mood and Affect: Mood normal.         Behavior: Behavior normal.         Thought Content: Thought content normal.         Judgment: Judgment normal.       Last  "Recorded Vitals  Blood pressure 100/59, pulse 75, temperature 36 °C (96.8 °F), temperature source Temporal, resp. rate 21, height 1.702 m (5' 7\"), weight 84.3 kg (185 lb 13.6 oz), SpO2 97 %.    Scheduled medications  atorvastatin, 10 mg, oral, Daily  bisoprolol, 5 mg, oral, Daily  enoxaparin, 30 mg, subcutaneous, q24h  ergocalciferol, 1,250 mcg, oral, Weekly  furosemide, 20 mg, oral, Daily  gabapentin, 300 mg, oral, BID  guaiFENesin, 600 mg, oral, BID  insulin glargine, 10 Units, subcutaneous, BID  ipratropium-albuteroL, 3 mL, nebulization, TID  ipratropium-albuteroL, , ,   lisinopril, 5 mg, oral, Daily  pantoprazole, 20 mg, oral, Daily before breakfast  piperacillin-tazobactam, 3.375 g, intravenous, q6h  primidone, 50 mg, oral, 4x daily  vancomycin, 1 g, intravenous, Once    PRN medications  PRN medications: acetaminophen, albuterol, HYDROcodone-acetaminophen, ipratropium-albuteroL, loratadine, meloxicam, oxygen    Relevant Results    CT chest wo IV contrast  Result Date: 2/24/2024  Basilar ground glass opacities superimposed on chronic changes and atelectasis.  Please correlate for component of pneumonia.  Basilar bronchiectasis and bronchial wall thickening suggesting acute bronchitis.  There are subtle pulmonary nodules. Coronary calculations are marker for coronary disease.     XR chest 1 view  Result Date: 2/23/2024  IMPRESSION: No acute radiographic abnormality.      Latest Reference Range & Units 02/25/24 05:17   Lactate 0.4 - 2.0 mmol/L 0.8   BNP 0 - 99 pg/mL 51      Latest Reference Range & Units 02/24/24 16:50 02/24/24 18:47 02/24/24 23:02 02/25/24 05:17   GLUCOSE 74 - 99 mg/dL 186 (H)   170 (H)   SODIUM 136 - 145 mmol/L 132 (L)   135 (L)   POTASSIUM 3.5 - 5.3 mmol/L 5.7 (H) 4.7  4.3   CHLORIDE 98 - 107 mmol/L 99   104   Bicarbonate 21 - 32 mmol/L 24   21   Anion Gap 10 - 20 mmol/L 15   14   Blood Urea Nitrogen 6 - 23 mg/dL 71 (H)   64 (H)   Creatinine 0.50 - 1.30 mg/dL 2.92 (H)   3.03 (H)   EGFR >60 " mL/min/1.73m*2 23 (L)   22 (L)   Calcium 8.6 - 10.3 mg/dL 9.5   7.5 (L)   Albumin 3.4 - 5.0 g/dL 4.0      Alkaline Phosphatase 33 - 136 U/L 62      ALT 10 - 52 U/L 33      AST 9 - 39 U/L 29      Bilirubin Total 0.0 - 1.2 mg/dL 0.4      Total Protein 6.4 - 8.2 g/dL 7.3      MAGNESIUM 1.60 - 2.40 mg/dL   1.76    Lactate 0.4 - 2.0 mmol/L 1.8   0.8   BNP 0 - 99 pg/mL    51      [facility] Reference Range & Units 02/24/24 16:50 02/25/24 05:17   WBC 4.4 - 11.3 x10*3/uL 11.7 (H) 8.2   nRBC 0.0 - 0.0 /100 WBCs 0.0 0.0   RBC 4.50 - 5.90 x10*6/uL 4.36 (L) 3.73 (L)   HEMOGLOBIN 13.5 - 17.5 g/dL 14.0 11.9 (L)   HEMATOCRIT 41.0 - 52.0 % 43.5 38.1 (L)   MCV 80 - 100 fL 100 102 (H)   MCH 26.0 - 34.0 pg 32.1 31.9   MCHC 32.0 - 36.0 g/dL 32.2 31.2 (L)   RED CELL DISTRIBUTION WIDTH 11.5 - 14.5 % 15.7 (H) 15.9 (H)   Platelets 150 - 450 x10*3/uL 183 155      Assessment/Plan   Principal Problem:    Pneumonia of both lungs due to infectious organism, unspecified part of lung  Active Problems:    Anemia    Arthritis    CKD (chronic kidney disease)    COPD (chronic obstructive pulmonary disease) (CMS/[facility])    Dyslipidemia    HTN (hypertension)    History of GI bleed    Type 2 diabetes mellitus (CMS/[facility])    Essential tremor    Peripheral neuropathy    RSV (acute bronchiolitis due to respiratory syncytial virus)    Pneumonia of both lungs due to infectious organism, unspecified part of lung  COPD (chronic obstructive pulmonary disease) (CMS/[facility])  RSV  - RSV +  - CT chest: Basilar ground glass opacities superimposed on chronic changes and atelectasis.  Please correlate for component of pneumonia.  Basilar bronchiectasis and bronchial wall thickening suggesting acute bronchitis.  There are subtle pulmonary nodules.   - WBC 11.7 > 8.2  - Given azith, ceftx, vanco in ED  - continue oral azith, IV ceftriaxone, day 2  - bronchodilators scheduled, PRN  - RT consult, on NC currently 3 liters, RUBY, is not on chronic O2  - follow CBC,  cultures    Dyslipidemia  HTN (hypertension)  - continue atorvastatin, bisoprolol  - monitor BP, HR    History of GI bleed  Anemia  - H/H 14/43.5 > 11.9/38.1  - trend H/H daily  - if declines further, guiac stool as needed    CKD (chronic kidney disease)  - creat 2.92 > 3.03  - continue sodium bicarb  - gentle hydration  - trend creat  - renally dose meds as needed  - avoid nephrotoxins    Type 2 diabetes mellitus (CMS/[facility])  Essential Tremor  Peripheral neuropathy  Arthritis  - continue gabapentin, primidone,   - accuchecks with SS  - continue lantus    GI ppx: PPI  DVT ppx: lovenox    Fluids: as needed  Electrolytes: replace as needed  Nutrition: carb control  Adjuncts: PIV    Code Status: full  Pt requires inpatient stay at this time.;  Total accumulated time spent face to face and not face to face preparing to see the patient, obtaining and reviewing separately obtained history; performing a medically appropriate examination and/or evaluation; counseling and educating the patient, family; ordering medications, tests, or procedures; referring and communicating with other health care professionals; documenting clinical information in the patient's medical record; independently interpreting results and communicating the results to the patient, family; and care coordination was 45 minutes.    Charo Weaver APRN-CNP

## 2024-02-25 NOTE — NURSING NOTE
0740: A & O x 4. Rt leg amputated at the hip. Repositions self in the bed.     0811: PATRICK Vanessa CNP at bedside, pt seen.     1000: Meg MEDEL notified of elevated creatinine, lasix & lisinopril placed on hold. Aware pt had them today.     1511: Meg MEDEL aware of SBP upper 80's. Pt downgraded to telemetry.

## 2024-02-25 NOTE — CARE PLAN
The patient's goals for the shift include      The clinical goals for the shift include No increase in O2 need this shift.    O2 weaned from 4L to 2L NC. Antibiotics initiated. Downgraded to med/surg telemetry.

## 2024-02-26 ENCOUNTER — APPOINTMENT (OUTPATIENT)
Dept: CARDIOLOGY | Facility: HOSPITAL | Age: 64
DRG: 871 | End: 2024-02-26
Payer: MEDICARE

## 2024-02-26 LAB
ANION GAP SERPL CALC-SCNC: 14 MMOL/L (ref 10–20)
BUN SERPL-MCNC: 59 MG/DL (ref 6–23)
CALCIUM SERPL-MCNC: 7.6 MG/DL (ref 8.6–10.3)
CHLORIDE SERPL-SCNC: 105 MMOL/L (ref 98–107)
CO2 SERPL-SCNC: 21 MMOL/L (ref 21–32)
CREAT SERPL-MCNC: 2.96 MG/DL (ref 0.5–1.3)
EGFRCR SERPLBLD CKD-EPI 2021: 23 ML/MIN/1.73M*2
ERYTHROCYTE [DISTWIDTH] IN BLOOD BY AUTOMATED COUNT: 16.1 % (ref 11.5–14.5)
GLUCOSE BLD MANUAL STRIP-MCNC: 146 MG/DL (ref 74–99)
GLUCOSE BLD MANUAL STRIP-MCNC: 198 MG/DL (ref 74–99)
GLUCOSE BLD MANUAL STRIP-MCNC: 224 MG/DL (ref 74–99)
GLUCOSE BLD MANUAL STRIP-MCNC: 248 MG/DL (ref 74–99)
GLUCOSE SERPL-MCNC: 128 MG/DL (ref 74–99)
HCT VFR BLD AUTO: 40.8 % (ref 41–52)
HGB BLD-MCNC: 12.5 G/DL (ref 13.5–17.5)
HOLD SPECIMEN: NORMAL
LEGIONELLA AG UR QL: NEGATIVE
MCH RBC QN AUTO: 32.2 PG (ref 26–34)
MCHC RBC AUTO-ENTMCNC: 30.6 G/DL (ref 32–36)
MCV RBC AUTO: 105 FL (ref 80–100)
NRBC BLD-RTO: 0 /100 WBCS (ref 0–0)
PLATELET # BLD AUTO: 152 X10*3/UL (ref 150–450)
POTASSIUM SERPL-SCNC: 4.1 MMOL/L (ref 3.5–5.3)
RBC # BLD AUTO: 3.88 X10*6/UL (ref 4.5–5.9)
SODIUM SERPL-SCNC: 136 MMOL/L (ref 136–145)
WBC # BLD AUTO: 7.8 X10*3/UL (ref 4.4–11.3)

## 2024-02-26 PROCEDURE — 85027 COMPLETE CBC AUTOMATED: CPT | Mod: IPSPLIT | Performed by: NURSE PRACTITIONER

## 2024-02-26 PROCEDURE — 94640 AIRWAY INHALATION TREATMENT: CPT | Mod: IPSPLIT

## 2024-02-26 PROCEDURE — 2500000002 HC RX 250 W HCPCS SELF ADMINISTERED DRUGS (ALT 637 FOR MEDICARE OP, ALT 636 FOR OP/ED): Mod: IPSPLIT

## 2024-02-26 PROCEDURE — 82374 ASSAY BLOOD CARBON DIOXIDE: CPT | Mod: IPSPLIT | Performed by: NURSE PRACTITIONER

## 2024-02-26 PROCEDURE — 82947 ASSAY GLUCOSE BLOOD QUANT: CPT | Mod: IPSPLIT

## 2024-02-26 PROCEDURE — 2500000002 HC RX 250 W HCPCS SELF ADMINISTERED DRUGS (ALT 637 FOR MEDICARE OP, ALT 636 FOR OP/ED): Mod: IPSPLIT | Performed by: NURSE PRACTITIONER

## 2024-02-26 PROCEDURE — 36415 COLL VENOUS BLD VENIPUNCTURE: CPT | Mod: IPSPLIT | Performed by: NURSE PRACTITIONER

## 2024-02-26 PROCEDURE — 2500000004 HC RX 250 GENERAL PHARMACY W/ HCPCS (ALT 636 FOR OP/ED): Mod: IPSPLIT | Performed by: NURSE PRACTITIONER

## 2024-02-26 PROCEDURE — 87506 IADNA-DNA/RNA PROBE TQ 6-11: CPT | Mod: GENLAB | Performed by: NURSE PRACTITIONER

## 2024-02-26 PROCEDURE — 87493 C DIFF AMPLIFIED PROBE: CPT | Mod: 59,GENLAB | Performed by: NURSE PRACTITIONER

## 2024-02-26 PROCEDURE — 99232 SBSQ HOSP IP/OBS MODERATE 35: CPT | Performed by: NURSE PRACTITIONER

## 2024-02-26 PROCEDURE — 93005 ELECTROCARDIOGRAM TRACING: CPT | Mod: IPSPLIT

## 2024-02-26 PROCEDURE — 2500000005 HC RX 250 GENERAL PHARMACY W/O HCPCS: Mod: IPSPLIT | Performed by: NURSE PRACTITIONER

## 2024-02-26 PROCEDURE — 1200000002 HC GENERAL ROOM WITH TELEMETRY DAILY: Mod: IPSPLIT

## 2024-02-26 PROCEDURE — 2500000001 HC RX 250 WO HCPCS SELF ADMINISTERED DRUGS (ALT 637 FOR MEDICARE OP): Mod: IPSPLIT | Performed by: NURSE PRACTITIONER

## 2024-02-26 RX ORDER — SODIUM CHLORIDE 9 MG/ML
100 INJECTION, SOLUTION INTRAVENOUS CONTINUOUS
Status: DISCONTINUED | OUTPATIENT
Start: 2024-02-26 | End: 2024-02-28

## 2024-02-26 RX ADMIN — CEFTRIAXONE 1 G: 1 INJECTION, SOLUTION INTRAVENOUS at 08:15

## 2024-02-26 RX ADMIN — BISOPROLOL FUMARATE 5 MG: 5 TABLET ORAL at 08:13

## 2024-02-26 RX ADMIN — PRIMIDONE 100 MG: 50 TABLET ORAL at 22:11

## 2024-02-26 RX ADMIN — HYDROCODONE BITARTRATE AND ACETAMINOPHEN 1 TABLET: 5; 325 TABLET ORAL at 22:11

## 2024-02-26 RX ADMIN — TIZANIDINE 4 MG: 4 TABLET ORAL at 22:12

## 2024-02-26 RX ADMIN — ENOXAPARIN SODIUM 30 MG: 30 INJECTION SUBCUTANEOUS at 22:11

## 2024-02-26 RX ADMIN — INSULIN GLARGINE 10 UNITS: 100 INJECTION, SOLUTION SUBCUTANEOUS at 08:10

## 2024-02-26 RX ADMIN — SODIUM CHLORIDE 100 ML/HR: 9 INJECTION, SOLUTION INTRAVENOUS at 15:25

## 2024-02-26 RX ADMIN — SODIUM BICARBONATE 650 MG TABLET 650 MG: at 22:11

## 2024-02-26 RX ADMIN — IPRATROPIUM BROMIDE AND ALBUTEROL SULFATE 3 ML: 2.5; .5 SOLUTION RESPIRATORY (INHALATION) at 09:10

## 2024-02-26 RX ADMIN — AZITHROMYCIN DIHYDRATE 500 MG: 250 TABLET ORAL at 08:11

## 2024-02-26 RX ADMIN — MELOXICAM 15 MG: 7.5 TABLET ORAL at 08:12

## 2024-02-26 RX ADMIN — IPRATROPIUM BROMIDE AND ALBUTEROL SULFATE 3 ML: 2.5; .5 SOLUTION RESPIRATORY (INHALATION) at 15:33

## 2024-02-26 RX ADMIN — IPRATROPIUM BROMIDE AND ALBUTEROL SULFATE 3 ML: 2.5; .5 SOLUTION RESPIRATORY (INHALATION) at 21:36

## 2024-02-26 RX ADMIN — ATORVASTATIN CALCIUM 10 MG: 10 TABLET, FILM COATED ORAL at 08:11

## 2024-02-26 RX ADMIN — INSULIN LISPRO 4 UNITS: 100 INJECTION, SOLUTION INTRAVENOUS; SUBCUTANEOUS at 12:37

## 2024-02-26 RX ADMIN — INSULIN GLARGINE 10 UNITS: 100 INJECTION, SOLUTION SUBCUTANEOUS at 22:14

## 2024-02-26 RX ADMIN — GABAPENTIN 300 MG: 300 CAPSULE ORAL at 08:10

## 2024-02-26 RX ADMIN — INSULIN LISPRO 4 UNITS: 100 INJECTION, SOLUTION INTRAVENOUS; SUBCUTANEOUS at 17:33

## 2024-02-26 RX ADMIN — Medication 2 L/MIN: at 19:13

## 2024-02-26 RX ADMIN — GUAIFENESIN 600 MG: 600 TABLET, EXTENDED RELEASE ORAL at 22:11

## 2024-02-26 RX ADMIN — PRIMIDONE 150 MG: 50 TABLET ORAL at 07:56

## 2024-02-26 RX ADMIN — LORATADINE 10 MG: 10 TABLET ORAL at 08:13

## 2024-02-26 RX ADMIN — GABAPENTIN 300 MG: 300 CAPSULE ORAL at 22:11

## 2024-02-26 RX ADMIN — GUAIFENESIN 600 MG: 600 TABLET, EXTENDED RELEASE ORAL at 08:11

## 2024-02-26 RX ADMIN — SODIUM BICARBONATE 650 MG TABLET 650 MG: at 08:15

## 2024-02-26 RX ADMIN — TIZANIDINE 4 MG: 4 TABLET ORAL at 08:13

## 2024-02-26 RX ADMIN — PRIMIDONE 50 MG: 50 TABLET ORAL at 12:43

## 2024-02-26 SDOH — ECONOMIC STABILITY: FOOD INSECURITY: WITHIN THE PAST 12 MONTHS, THE FOOD YOU BOUGHT JUST DIDN'T LAST AND YOU DIDN'T HAVE MONEY TO GET MORE.: NEVER TRUE

## 2024-02-26 SDOH — ECONOMIC STABILITY: TRANSPORTATION INSECURITY
IN THE PAST 12 MONTHS, HAS LACK OF TRANSPORTATION KEPT YOU FROM MEETINGS, WORK, OR FROM GETTING THINGS NEEDED FOR DAILY LIVING?: NO

## 2024-02-26 SDOH — ECONOMIC STABILITY: INCOME INSECURITY: HOW HARD IS IT FOR YOU TO PAY FOR THE VERY BASICS LIKE FOOD, HOUSING, MEDICAL CARE, AND HEATING?: NOT HARD AT ALL

## 2024-02-26 SDOH — SOCIAL STABILITY: SOCIAL INSECURITY: WITHIN THE LAST YEAR, HAVE YOU BEEN AFRAID OF YOUR PARTNER OR EX-PARTNER?: NO

## 2024-02-26 SDOH — ECONOMIC STABILITY: TRANSPORTATION INSECURITY
IN THE PAST 12 MONTHS, HAS THE LACK OF TRANSPORTATION KEPT YOU FROM MEDICAL APPOINTMENTS OR FROM GETTING MEDICATIONS?: NO

## 2024-02-26 SDOH — SOCIAL STABILITY: SOCIAL NETWORK: ARE YOU MARRIED, WIDOWED, DIVORCED, SEPARATED, NEVER MARRIED, OR LIVING WITH A PARTNER?: MARRIED

## 2024-02-26 SDOH — ECONOMIC STABILITY: FOOD INSECURITY: WITHIN THE PAST 12 MONTHS, YOU WORRIED THAT YOUR FOOD WOULD RUN OUT BEFORE YOU GOT MONEY TO BUY MORE.: NEVER TRUE

## 2024-02-26 SDOH — ECONOMIC STABILITY: HOUSING INSECURITY: IN THE LAST 12 MONTHS, HOW MANY PLACES HAVE YOU LIVED?: 2

## 2024-02-26 SDOH — ECONOMIC STABILITY: INCOME INSECURITY: IN THE PAST 12 MONTHS, HAS THE ELECTRIC, GAS, OIL, OR WATER COMPANY THREATENED TO SHUT OFF SERVICE IN YOUR HOME?: NO

## 2024-02-26 SDOH — ECONOMIC STABILITY: HOUSING INSECURITY
IN THE LAST 12 MONTHS, WAS THERE A TIME WHEN YOU DID NOT HAVE A STEADY PLACE TO SLEEP OR SLEPT IN A SHELTER (INCLUDING NOW)?: NO

## 2024-02-26 SDOH — ECONOMIC STABILITY: INCOME INSECURITY: IN THE LAST 12 MONTHS, WAS THERE A TIME WHEN YOU WERE NOT ABLE TO PAY THE MORTGAGE OR RENT ON TIME?: NO

## 2024-02-26 ASSESSMENT — COGNITIVE AND FUNCTIONAL STATUS - GENERAL
TURNING FROM BACK TO SIDE WHILE IN FLAT BAD: A LOT
STANDING UP FROM CHAIR USING ARMS: A LOT
MOVING TO AND FROM BED TO CHAIR: A LOT
EATING MEALS: A LITTLE
TOILETING: TOTAL
HELP NEEDED FOR BATHING: A LOT
MOVING FROM LYING ON BACK TO SITTING ON SIDE OF FLAT BED WITH BEDRAILS: A LITTLE
MOBILITY SCORE: 11
TOILETING: TOTAL
HELP NEEDED FOR BATHING: A LOT
CLIMB 3 TO 5 STEPS WITH RAILING: TOTAL
EATING MEALS: A LITTLE
WALKING IN HOSPITAL ROOM: TOTAL
PERSONAL GROOMING: A LITTLE
MOVING FROM LYING ON BACK TO SITTING ON SIDE OF FLAT BED WITH BEDRAILS: A LITTLE
PERSONAL GROOMING: A LITTLE
STANDING UP FROM CHAIR USING ARMS: A LOT
DRESSING REGULAR UPPER BODY CLOTHING: A LOT
MOBILITY SCORE: 11
TURNING FROM BACK TO SIDE WHILE IN FLAT BAD: A LOT
MOVING TO AND FROM BED TO CHAIR: A LOT
DRESSING REGULAR LOWER BODY CLOTHING: A LOT
DRESSING REGULAR UPPER BODY CLOTHING: A LOT
DAILY ACTIVITIY SCORE: 13
DAILY ACTIVITIY SCORE: 13
DRESSING REGULAR LOWER BODY CLOTHING: A LOT
CLIMB 3 TO 5 STEPS WITH RAILING: TOTAL
WALKING IN HOSPITAL ROOM: TOTAL

## 2024-02-26 ASSESSMENT — PAIN - FUNCTIONAL ASSESSMENT
PAIN_FUNCTIONAL_ASSESSMENT: 0-10

## 2024-02-26 ASSESSMENT — PAIN SCALES - GENERAL
PAINLEVEL_OUTOF10: 10 - WORST POSSIBLE PAIN
PAINLEVEL_OUTOF10: 0 - NO PAIN
PAINLEVEL_OUTOF10: 8
PAINLEVEL_OUTOF10: 10 - WORST POSSIBLE PAIN
PAINLEVEL_OUTOF10: 0 - NO PAIN
PAINLEVEL_OUTOF10: 4

## 2024-02-26 ASSESSMENT — PAIN DESCRIPTION - DESCRIPTORS
DESCRIPTORS: CRAMPING
DESCRIPTORS: CRAMPING

## 2024-02-26 ASSESSMENT — PAIN DESCRIPTION - LOCATION
LOCATION: LEG
LOCATION: BUTTOCKS

## 2024-02-26 NOTE — NURSING NOTE
Adaptive silverware and cups ordered for patient due to tremors. Patient spilled his water and is having difficulty feeding himself.    no

## 2024-02-26 NOTE — CARE PLAN
The patient's goals for the shift include  To decrease in amount of stool    The clinical goals for the shift include Maintain pulse ox of 92% or greater    Over the shift, the patient did make progress toward the following goals.     1400 Patient transferred from ICU.    1420 Assumed care of patient. Oriented to room and surroundings. Assessment completed. Patient eating sitting on bedpan with frequent liquid bowel movement. Below right hip amputation. Denies pain or complaints at this time. Call light within reach.    1430 Order given for fecal management system, patient agrees to placement. System placed and immediate drainage of 500 ml of liquid brown stool. Specimen previously sent for C-diff. Patient tolerated well. Call light within reach.    1525 IVF hung. Stool specimen for pathogen sent to lab. Patient repositioned.

## 2024-02-26 NOTE — CARE PLAN
The patient's goals for the shift include pain level less than 5.    The clinical goals for the shift include patient performing IS independently every two hours.

## 2024-02-26 NOTE — PROGRESS NOTES
"Jarret Bledsoe is a 64 y.o. male on day 2 of admission presenting with Pneumonia of both lungs due to infectious organism, unspecified part of lung.    Subjective   He is resting in bed today, feeling \"bad\", he has had a few episodes of diarrhea that are distressing to him. He is mildly tachypneic, sats >94%. Nutritional shakes ordered for him since he does not want to eat. Will continue to monitor, all questions answered.      Objective     Physical Exam  Constitutional:       General: He is not in acute distress.     Appearance: Normal appearance. He is not toxic-appearing.   HENT:      Head: Normocephalic and atraumatic.      Mouth/Throat:      Mouth: Mucous membranes are moist.   Eyes:      Extraocular Movements: Extraocular movements intact.      Pupils: Pupils are equal, round, and reactive to light.   Cardiovascular:      Rate and Rhythm: Normal rate and regular rhythm.      Pulses: Normal pulses.      Heart sounds: Normal heart sounds. No murmur heard.     No gallop.   Pulmonary:      Effort: Pulmonary effort is normal. No respiratory distress.      Breath sounds: Wheezing and rhonchi present. No rales.   Abdominal:      General: Bowel sounds are normal. There is no distension.      Palpations: Abdomen is soft.      Tenderness: There is no abdominal tenderness. There is no guarding.   Musculoskeletal:         General: No swelling or tenderness. Normal range of motion.      Cervical back: Normal range of motion and neck supple.      Left lower leg: No edema.   Skin:     General: Skin is warm and dry.      Capillary Refill: Capillary refill takes less than 2 seconds.      Coloration: Skin is not jaundiced.      Findings: No bruising or rash.   Neurological:      General: No focal deficit present.      Mental Status: He is alert and oriented to person, place, and time.      Cranial Nerves: No cranial nerve deficit.      Sensory: No sensory deficit.      Motor: No weakness.      Gait: Gait normal. " "  Psychiatric:         Mood and Affect: Mood normal.         Behavior: Behavior normal.         Thought Content: Thought content normal.         Judgment: Judgment normal.       Last Recorded Vitals  Blood pressure 165/78, pulse 106, temperature 37.8 °C (100 °F), temperature source Temporal, resp. rate (!) 27, height 1.702 m (5' 7\"), weight 82.8 kg (182 lb 8.7 oz), SpO2 96 %.  Intake/Output last 3 Shifts:  I/O last 3 completed shifts:  In: 2327 (28.1 mL/kg) [P.O.:2020; I.V.:207 (2.5 mL/kg); IV Piggyback:100]  Out: 1750 (21.1 mL/kg) [Urine:1750 (0.6 mL/kg/hr)]  Weight: 82.8 kg     Relevant Results  CT chest wo IV contrast  Result Date: 2/24/2024  Basilar ground glass opacities superimposed on chronic changes and atelectasis.  Please correlate for component of pneumonia.  Basilar bronchiectasis and bronchial wall thickening suggesting acute bronchitis.  There are subtle pulmonary nodules. Coronary calculations are marker for coronary disease.      XR chest 1 view  Result Date: 2/23/2024  IMPRESSION: No acute radiographic abnormality.      Latest Reference Range & Units 02/24/24 16:50 02/24/24 18:47 02/24/24 23:02 02/25/24 05:17 02/26/24 04:49   GLUCOSE 74 - 99 mg/dL 186 (H)   170 (H) 128 (H)   SODIUM 136 - 145 mmol/L 132 (L)   135 (L) 136   POTASSIUM 3.5 - 5.3 mmol/L 5.7 (H) 4.7  4.3 4.1   CHLORIDE 98 - 107 mmol/L 99   104 105   Bicarbonate 21 - 32 mmol/L 24   21 21   Anion Gap 10 - 20 mmol/L 15   14 14   Blood Urea Nitrogen 6 - 23 mg/dL 71 (H)   64 (H) 59 (H)   Creatinine 0.50 - 1.30 mg/dL 2.92 (H)   3.03 (H) 2.96 (H)   EGFR >60 mL/min/1.73m*2 23 (L)   22 (L) 23 (L)   Calcium 8.6 - 10.3 mg/dL 9.5   7.5 (L) 7.6 (L)   Albumin 3.4 - 5.0 g/dL 4.0       Alkaline Phosphatase 33 - 136 U/L 62       ALT 10 - 52 U/L 33       AST 9 - 39 U/L 29       Bilirubin Total 0.0 - 1.2 mg/dL 0.4       Procalcitonin <=0.07 ng/mL   1.25 (H)     Total Protein 6.4 - 8.2 g/dL 7.3       MAGNESIUM 1.60 - 2.40 mg/dL   1.76     Lactate 0.4 - 2.0 " mmol/L 1.8   0.8    BNP 0 - 99 pg/mL    51       Latest Reference Range & Units 02/24/24 16:50 02/25/24 05:17 02/26/24 04:49   WBC 4.4 - 11.3 x10*3/uL 11.7 (H) 8.2 7.8   nRBC 0.0 - 0.0 /100 WBCs 0.0 0.0 0.0   RBC 4.50 - 5.90 x10*6/uL 4.36 (L) 3.73 (L) 3.88 (L)   HEMOGLOBIN 13.5 - 17.5 g/dL 14.0 11.9 (L) 12.5 (L)   HEMATOCRIT 41.0 - 52.0 % 43.5 38.1 (L) 40.8 (L)   MCV 80 - 100 fL 100 102 (H) 105 (H)   MCH 26.0 - 34.0 pg 32.1 31.9 32.2   MCHC 32.0 - 36.0 g/dL 32.2 31.2 (L) 30.6 (L)   RED CELL DISTRIBUTION WIDTH 11.5 - 14.5 % 15.7 (H) 15.9 (H) 16.1 (H)   Platelets 150 - 450 x10*3/uL 183 155 152     Assessment/Plan   Principal Problem:    Pneumonia of both lungs due to infectious organism, unspecified part of lung  Active Problems:    Anemia    Arthritis    CKD (chronic kidney disease)    COPD (chronic obstructive pulmonary disease) (CMS/[facility])    Dyslipidemia    HTN (hypertension)    History of GI bleed    Type 2 diabetes mellitus (CMS/[facility])    Essential tremor    Peripheral neuropathy    RSV (acute bronchiolitis due to respiratory syncytial virus)    Pneumonia of both lungs due to infectious organism, unspecified part of lung  COPD (chronic obstructive pulmonary disease) (CMS/[facility])  RSV  - RSV +  - CT chest: Basilar ground glass opacities superimposed on chronic changes and atelectasis.  Please correlate for component of pneumonia.  Basilar bronchiectasis and bronchial wall thickening suggesting acute bronchitis.  There are subtle pulmonary nodules.   - WBC 11.7 > 8.2 > 7.8  - Given azith, ceftx, vanco in ED  - continue oral azith, IV ceftriaxone, day 3  - bronchodilators scheduled, PRN  - RT consult, on NC currently 3 liters, RUBY, is not on chronic O2  - follow CBC, cultures     Dyslipidemia  HTN (hypertension)  - continue atorvastatin, bisoprolol  - monitor BP, HR    Diarrhea   - started 2/25 evening  - multiple episodes  - Cdiff pending  - shakes ordered     History of GI bleed  Anemia  - H/H 14/43.5 > 11.9/38.1 >  12.5/40.8  - trend H/H daily  - if declines further, guiac stool as needed     CKD (chronic kidney disease)  - creat 2.92 > 3.03 > 2.96  - continue sodium bicarb  - gentle hydration  - trend creat  - renally dose meds as needed  - avoid nephrotoxins     Type 2 diabetes mellitus (CMS/[facility])  Essential Tremor  Peripheral neuropathy  Arthritis  - continue gabapentin, primidone,   - accuchecks with SS  - continue lantus     GI ppx: PPI  DVT ppx: lovenox     Fluids: as needed  Electrolytes: replace as needed  Nutrition: carb control  Adjuncts: PIV     Code Status: full  Pt requires inpatient stay at this time.;  Total accumulated time spent face to face and not face to face preparing to see the patient, obtaining and reviewing separately obtained history; performing a medically appropriate examination and/or evaluation; counseling and educating the patient, family; ordering medications, tests, or procedures; referring and communicating with other health care professionals; documenting clinical information in the patient's medical record; independently interpreting results and communicating the results to the patient, family; and care coordination was 35 minutes.     Charo Weaver, APRN-CNP

## 2024-02-26 NOTE — NURSING NOTE
Patient trasnfered to med/tele room 206 via bed. Patient's wife aware patient transferring, but not aware of what room. Still awaiting fax re: patient's code status from [facility].

## 2024-02-26 NOTE — PROGRESS NOTES
02/26/24 1304   Discharge Planning   Living Arrangements Spouse/significant other   Support Systems Other (Comment)  (Resides at a Assisted Living)   Assistance Needed ADLs/IADLs   Type of Residence Assisted living  ([facility])   Do you have animals or pets at home? No   Care Facility Name [facility]   Home or Post Acute Services Other (Comment)  (Resume help at AL)   RoundTrip coordination needed? Yes   Has discharge transport been arranged? No     TCC spoke with patient regarding discharge planning and home going needs. Patient lives  with his wife at [facility]. His room is handicapped accessible.   Patient has a right amputated leg.  Patient says he uses a power chair to move around.  He gets assistance with ADLs/IADLs.  He is on room air.  His wife also is in a power chair and unable to visit him.  He will need transportation back to facility.  Confirmed with patient PCP is  Trish Echavarria and he gets mail order prescriptions.  Discharge Plan is for patient to return to the AL.  TCC will continue to follow for changes in discharge planning needs.

## 2024-02-26 NOTE — DISCHARGE INSTR - OTHER ORDERS
Thank you for choosing [facility] for your Health Care needs.  Also, thank you for allowing us to take you and your families preferences into account when determining your discharge plan.  Stay well!    Your Care Transitions Team Member: Brie Salguero Amanda or Trung [phone]     For questions about medications listed on your discharge instructions, please call the Nurses Station at [phone].

## 2024-02-26 NOTE — CARE PLAN
Patient goal for the shift includes controlled chronic pain.   The clinical goals for the shift include Remain free of increased oxygen needs overnight    Over the shift, the patient did not make progress toward the following goals. Patient failed to participate in ADLs overnight due to increase in chronic pain to leg. Patient incontinent of bladder overnight, male purewick placed to prevent skin breakdown. Patient ST overnight with chills but resided once pain was controlled. Patient on 2L NC O2 overnight, unable to wean to baseline which is room air. Patient has blanchable redness to buttocks along with birthmark, mepilex in place to prevent skin breakdown. IV to left AC removed as it was leaking. Patient educated on call light use but fails to utilize it when in need.     Problem: Skin  Goal: Prevent/manage excess moisture  Outcome: Not Progressing  Flowsheets (Taken 2/26/2024 0517)  Prevent/manage excess moisture:   Moisturize dry skin   Monitor for/manage infection if present   Cleanse incontinence/protect with barrier cream  Goal: Prevent/minimize sheer/friction injuries  Outcome: Not Progressing  Flowsheets (Taken 2/26/2024 0517)  Prevent/minimize sheer/friction injuries:   Use pull sheet   Increase activity/out of bed for meals   HOB 30 degrees or less   Turn/reposition every 2 hours/use positioning/transfer devices     Problem: Respiratory  Goal: Wean oxygen to maintain O2 saturation per order/standard this shift  Outcome: Not Progressing     Problem: Pain  Goal: My pain/discomfort is manageable  Outcome: Not Progressing     Problem: Pain  Goal: Takes deep breaths with improved pain control throughout the shift  Outcome: Not Progressing  Goal: Turns in bed with improved pain control throughout the shift  Outcome: Not Progressing  Goal: Walks with improved pain control throughout the shift  Outcome: Not Progressing  Goal: Performs ADL's with improved pain control throughout shift  Outcome: Not  Progressing  Goal: Participates in PT with improved pain control throughout the shift  Outcome: Not Progressing

## 2024-02-27 LAB
ANION GAP SERPL CALC-SCNC: 12 MMOL/L (ref 10–20)
BUN SERPL-MCNC: 55 MG/DL (ref 6–23)
C COLI+JEJ+UPSA DNA STL QL NAA+PROBE: NOT DETECTED
C DIF TOX TCDA+TCDB STL QL NAA+PROBE: NOT DETECTED
CALCIUM SERPL-MCNC: 7.4 MG/DL (ref 8.6–10.3)
CHLORIDE SERPL-SCNC: 110 MMOL/L (ref 98–107)
CO2 SERPL-SCNC: 19 MMOL/L (ref 21–32)
CREAT SERPL-MCNC: 2.55 MG/DL (ref 0.5–1.3)
EC STX1 GENE STL QL NAA+PROBE: NOT DETECTED
EC STX2 GENE STL QL NAA+PROBE: NOT DETECTED
EGFRCR SERPLBLD CKD-EPI 2021: 27 ML/MIN/1.73M*2
ERYTHROCYTE [DISTWIDTH] IN BLOOD BY AUTOMATED COUNT: 16.1 % (ref 11.5–14.5)
GLUCOSE BLD MANUAL STRIP-MCNC: 181 MG/DL (ref 74–99)
GLUCOSE BLD MANUAL STRIP-MCNC: 187 MG/DL (ref 74–99)
GLUCOSE BLD MANUAL STRIP-MCNC: 252 MG/DL (ref 74–99)
GLUCOSE BLD MANUAL STRIP-MCNC: 291 MG/DL (ref 74–99)
GLUCOSE SERPL-MCNC: 199 MG/DL (ref 74–99)
HCT VFR BLD AUTO: 37.5 % (ref 41–52)
HGB BLD-MCNC: 12 G/DL (ref 13.5–17.5)
HOLD SPECIMEN: NORMAL
MAGNESIUM SERPL-MCNC: 1.82 MG/DL (ref 1.6–2.4)
MCH RBC QN AUTO: 32.1 PG (ref 26–34)
MCHC RBC AUTO-ENTMCNC: 32 G/DL (ref 32–36)
MCV RBC AUTO: 100 FL (ref 80–100)
NOROVIRUS GI + GII RNA STL NAA+PROBE: NOT DETECTED
NRBC BLD-RTO: 0 /100 WBCS (ref 0–0)
PLATELET # BLD AUTO: 148 X10*3/UL (ref 150–450)
POTASSIUM SERPL-SCNC: 4 MMOL/L (ref 3.5–5.3)
RBC # BLD AUTO: 3.74 X10*6/UL (ref 4.5–5.9)
RV RNA STL NAA+PROBE: NOT DETECTED
SALMONELLA DNA STL QL NAA+PROBE: NOT DETECTED
SHIGELLA DNA SPEC QL NAA+PROBE: NOT DETECTED
SODIUM SERPL-SCNC: 137 MMOL/L (ref 136–145)
V CHOLERAE DNA STL QL NAA+PROBE: NOT DETECTED
WBC # BLD AUTO: 5.6 X10*3/UL (ref 4.4–11.3)
Y ENTEROCOL DNA STL QL NAA+PROBE: NOT DETECTED

## 2024-02-27 PROCEDURE — 99232 SBSQ HOSP IP/OBS MODERATE 35: CPT | Performed by: NURSE PRACTITIONER

## 2024-02-27 PROCEDURE — 2500000002 HC RX 250 W HCPCS SELF ADMINISTERED DRUGS (ALT 637 FOR MEDICARE OP, ALT 636 FOR OP/ED): Mod: IPSPLIT | Performed by: NURSE PRACTITIONER

## 2024-02-27 PROCEDURE — 9420000001 HC RT PATIENT EDUCATION 5 MIN: Mod: IPSPLIT

## 2024-02-27 PROCEDURE — 94667 MNPJ CHEST WALL 1ST: CPT | Mod: IPSPLIT

## 2024-02-27 PROCEDURE — 94640 AIRWAY INHALATION TREATMENT: CPT | Mod: IPSPLIT

## 2024-02-27 PROCEDURE — 94668 MNPJ CHEST WALL SBSQ: CPT | Mod: IPSPLIT

## 2024-02-27 PROCEDURE — 2500000004 HC RX 250 GENERAL PHARMACY W/ HCPCS (ALT 636 FOR OP/ED): Mod: IPSPLIT | Performed by: NURSE PRACTITIONER

## 2024-02-27 PROCEDURE — 36415 COLL VENOUS BLD VENIPUNCTURE: CPT | Mod: IPSPLIT | Performed by: NURSE PRACTITIONER

## 2024-02-27 PROCEDURE — 1200000002 HC GENERAL ROOM WITH TELEMETRY DAILY: Mod: IPSPLIT

## 2024-02-27 PROCEDURE — 51702 INSERT TEMP BLADDER CATH: CPT | Mod: IPSPLIT

## 2024-02-27 PROCEDURE — 85027 COMPLETE CBC AUTOMATED: CPT | Mod: IPSPLIT | Performed by: NURSE PRACTITIONER

## 2024-02-27 PROCEDURE — 2500000001 HC RX 250 WO HCPCS SELF ADMINISTERED DRUGS (ALT 637 FOR MEDICARE OP): Mod: IPSPLIT | Performed by: NURSE PRACTITIONER

## 2024-02-27 PROCEDURE — 83735 ASSAY OF MAGNESIUM: CPT | Mod: IPSPLIT | Performed by: NURSE PRACTITIONER

## 2024-02-27 PROCEDURE — 82374 ASSAY BLOOD CARBON DIOXIDE: CPT | Mod: IPSPLIT | Performed by: NURSE PRACTITIONER

## 2024-02-27 PROCEDURE — 82947 ASSAY GLUCOSE BLOOD QUANT: CPT | Mod: IPSPLIT

## 2024-02-27 RX ORDER — AMOXICILLIN AND CLAVULANATE POTASSIUM 875; 125 MG/1; MG/1
1 TABLET, FILM COATED ORAL EVERY 12 HOURS SCHEDULED
Status: DISCONTINUED | OUTPATIENT
Start: 2024-02-27 | End: 2024-02-29 | Stop reason: HOSPADM

## 2024-02-27 RX ADMIN — INSULIN LISPRO 2 UNITS: 100 INJECTION, SOLUTION INTRAVENOUS; SUBCUTANEOUS at 17:31

## 2024-02-27 RX ADMIN — AZITHROMYCIN DIHYDRATE 500 MG: 250 TABLET ORAL at 08:35

## 2024-02-27 RX ADMIN — AMOXICILLIN AND CLAVULANATE POTASSIUM 1 TABLET: 875; 125 TABLET, FILM COATED ORAL at 21:56

## 2024-02-27 RX ADMIN — GABAPENTIN 300 MG: 300 CAPSULE ORAL at 21:56

## 2024-02-27 RX ADMIN — SODIUM CHLORIDE 100 ML/HR: 9 INJECTION, SOLUTION INTRAVENOUS at 21:56

## 2024-02-27 RX ADMIN — IPRATROPIUM BROMIDE AND ALBUTEROL SULFATE 3 ML: 2.5; .5 SOLUTION RESPIRATORY (INHALATION) at 21:24

## 2024-02-27 RX ADMIN — SODIUM CHLORIDE 100 ML/HR: 9 INJECTION, SOLUTION INTRAVENOUS at 12:23

## 2024-02-27 RX ADMIN — SODIUM BICARBONATE 650 MG TABLET 650 MG: at 08:35

## 2024-02-27 RX ADMIN — PRIMIDONE 50 MG: 50 TABLET ORAL at 12:31

## 2024-02-27 RX ADMIN — INSULIN LISPRO 2 UNITS: 100 INJECTION, SOLUTION INTRAVENOUS; SUBCUTANEOUS at 08:14

## 2024-02-27 RX ADMIN — GABAPENTIN 300 MG: 300 CAPSULE ORAL at 08:35

## 2024-02-27 RX ADMIN — PANTOPRAZOLE SODIUM 20 MG: 20 TABLET, DELAYED RELEASE ORAL at 06:03

## 2024-02-27 RX ADMIN — GUAIFENESIN 600 MG: 600 TABLET, EXTENDED RELEASE ORAL at 08:35

## 2024-02-27 RX ADMIN — INSULIN LISPRO 6 UNITS: 100 INJECTION, SOLUTION INTRAVENOUS; SUBCUTANEOUS at 12:25

## 2024-02-27 RX ADMIN — PRIMIDONE 100 MG: 50 TABLET ORAL at 21:55

## 2024-02-27 RX ADMIN — SODIUM CHLORIDE 100 ML/HR: 9 INJECTION, SOLUTION INTRAVENOUS at 01:17

## 2024-02-27 RX ADMIN — BISOPROLOL FUMARATE 5 MG: 5 TABLET ORAL at 08:35

## 2024-02-27 RX ADMIN — GUAIFENESIN 600 MG: 600 TABLET, EXTENDED RELEASE ORAL at 21:56

## 2024-02-27 RX ADMIN — SODIUM BICARBONATE 650 MG TABLET 650 MG: at 21:56

## 2024-02-27 RX ADMIN — INSULIN GLARGINE 10 UNITS: 100 INJECTION, SOLUTION SUBCUTANEOUS at 08:36

## 2024-02-27 RX ADMIN — INSULIN GLARGINE 10 UNITS: 100 INJECTION, SOLUTION SUBCUTANEOUS at 21:04

## 2024-02-27 RX ADMIN — HYDROCODONE BITARTRATE AND ACETAMINOPHEN 1 TABLET: 5; 325 TABLET ORAL at 06:04

## 2024-02-27 RX ADMIN — PRIMIDONE 150 MG: 50 TABLET ORAL at 08:34

## 2024-02-27 RX ADMIN — IPRATROPIUM BROMIDE AND ALBUTEROL SULFATE 3 ML: 2.5; .5 SOLUTION RESPIRATORY (INHALATION) at 09:40

## 2024-02-27 RX ADMIN — AMOXICILLIN AND CLAVULANATE POTASSIUM 1 TABLET: 875; 125 TABLET, FILM COATED ORAL at 12:31

## 2024-02-27 ASSESSMENT — PAIN SCALES - GENERAL
PAINLEVEL_OUTOF10: 0 - NO PAIN
PAINLEVEL_OUTOF10: 0 - NO PAIN

## 2024-02-27 ASSESSMENT — COGNITIVE AND FUNCTIONAL STATUS - GENERAL
TURNING FROM BACK TO SIDE WHILE IN FLAT BAD: A LOT
WALKING IN HOSPITAL ROOM: TOTAL
MOVING TO AND FROM BED TO CHAIR: TOTAL
CLIMB 3 TO 5 STEPS WITH RAILING: TOTAL
MOVING FROM LYING ON BACK TO SITTING ON SIDE OF FLAT BED WITH BEDRAILS: A LOT
DRESSING REGULAR LOWER BODY CLOTHING: A LOT
TOILETING: A LOT
DRESSING REGULAR UPPER BODY CLOTHING: A LOT
STANDING UP FROM CHAIR USING ARMS: TOTAL
EATING MEALS: A LOT
DAILY ACTIVITIY SCORE: 12
MOBILITY SCORE: 8
PERSONAL GROOMING: A LOT
HELP NEEDED FOR BATHING: A LOT

## 2024-02-27 ASSESSMENT — PAIN DESCRIPTION - LOCATION: LOCATION: LEG

## 2024-02-27 NOTE — CARE PLAN
Problem: Skin  Goal: Decreased wound size/increased tissue granulation at next dressing change  Outcome: Progressing  Goal: Participates in plan/prevention/treatment measures  Outcome: Progressing  Goal: Prevent/manage excess moisture  Outcome: Progressing  Goal: Prevent/minimize sheer/friction injuries  Outcome: Progressing  Goal: Promote/optimize nutrition  Outcome: Progressing  Goal: Promote skin healing  Outcome: Progressing     Problem: Diabetes  Goal: Achieve decreasing blood glucose levels by end of shift  Outcome: Progressing  Goal: Increase stability of blood glucose readings by end of shift  Outcome: Progressing  Goal: Decrease in ketones present in urine by end of shift  Outcome: Progressing  Goal: Maintain electrolyte levels within acceptable range throughout shift  Outcome: Progressing  Goal: Maintain glucose levels >70mg/dl to <250mg/dl throughout shift  Outcome: Progressing  Goal: No changes in neurological exam by end of shift  Outcome: Progressing  Goal: Learn about and adhere to nutrition recommendations by end of shift  Outcome: Progressing  Goal: Vital signs within normal range for age by end of shift  Outcome: Progressing  Goal: Increase self care and/or family involovement by end of shift  Outcome: Progressing  Goal: Receive DSME education by end of shift  Outcome: Progressing     Problem: Respiratory  Goal: Clear secretions with interventions this shift  Outcome: Progressing  Goal: Minimize anxiety/maximize coping throughout shift  Outcome: Progressing  Goal: Minimal/no exertional discomfort or dyspnea this shift  Outcome: Progressing  Goal: No signs of respiratory distress (eg. Use of accessory muscles. Peds grunting)  Outcome: Progressing  Goal: Tolerate pulmonary toileting this shift  Outcome: Progressing  Goal: Verbalize decreased shortness of breath this shift  Outcome: Progressing  Goal: Wean oxygen to maintain O2 saturation per order/standard this shift  Outcome: Progressing  Goal:  Increase self care and/or family involvement in next 24 hours  Outcome: Progressing     Problem: Pain  Goal: My pain/discomfort is manageable  Outcome: Progressing     Problem: Safety  Goal: Patient will be injury free during hospitalization  Outcome: Progressing  Goal: I will remain free of falls  Outcome: Progressing     Problem: Discharge Planning  Goal: Discharge to home or other facility with appropriate resources  Outcome: Progressing     Problem: Chronic Conditions and Co-morbidities  Goal: Patient's chronic conditions and co-morbidity symptoms are monitored and maintained or improved  Outcome: Progressing     Problem: Pain  Goal: Takes deep breaths with improved pain control throughout the shift  Outcome: Progressing  Goal: Turns in bed with improved pain control throughout the shift  Outcome: Progressing  Goal: Walks with improved pain control throughout the shift  Outcome: Progressing  Goal: Performs ADL's with improved pain control throughout shift  Outcome: Progressing  Goal: Participates in PT with improved pain control throughout the shift  Outcome: Progressing  Goal: Free from opioid side effects throughout the shift  Outcome: Progressing  Goal: Free from acute confusion related to pain meds throughout the shift  Outcome: Progressing   The patient's goals for the shift include      The clinical goals for the shift include Maintain pulse ox of 92% or higher    Over the shift, the patient did not make progress toward the following goals. Barriers to progression include . Recommendations to address these barriers include .

## 2024-02-27 NOTE — CARE PLAN
Problem: Skin  Goal: Decreased wound size/increased tissue granulation at next dressing change  2/26/2024 2324 by Nara Mccann RN  Outcome: Progressing  2/26/2024 2309 by Nara Mccann RN  Outcome: Progressing  Goal: Participates in plan/prevention/treatment measures  2/26/2024 2324 by Nara Mccann RN  Outcome: Progressing  2/26/2024 2309 by Nara Mccann RN  Outcome: Progressing  Goal: Prevent/manage excess moisture  2/26/2024 2324 by Nara Mccann RN  Outcome: Progressing  2/26/2024 2309 by Nara Mccann RN  Outcome: Progressing  Goal: Prevent/minimize sheer/friction injuries  2/26/2024 2324 by Nara Mccann RN  Outcome: Progressing  2/26/2024 2309 by Nara Mccann RN  Outcome: Progressing  Goal: Promote/optimize nutrition  2/26/2024 2324 by Nara Mccann RN  Outcome: Progressing  2/26/2024 2309 by Nara Mccann RN  Outcome: Progressing  Goal: Promote skin healing  2/26/2024 2324 by Nara Mccann RN  Outcome: Progressing  2/26/2024 2309 by Nara Mccann RN  Outcome: Progressing     Problem: Diabetes  Goal: Achieve decreasing blood glucose levels by end of shift  2/26/2024 2324 by Nara Mccann RN  Outcome: Progressing  2/26/2024 2309 by Nara Mccann RN  Outcome: Progressing  Goal: Increase stability of blood glucose readings by end of shift  2/26/2024 2324 by Nara Mccann RN  Outcome: Progressing  2/26/2024 2309 by Nara Mccann RN  Outcome: Progressing  Goal: Decrease in ketones present in urine by end of shift  2/26/2024 2324 by Nara Mccann RN  Outcome: Progressing  2/26/2024 2309 by Nara Mccann RN  Outcome: Progressing  Goal: Maintain electrolyte levels within acceptable range throughout shift  2/26/2024 2324 by Nara Mccann RN  Outcome: Progressing  2/26/2024 2309 by Nara Mccann RN  Outcome: Progressing  Goal: Maintain glucose levels >70mg/dl to <250mg/dl  throughout shift  2/26/2024 2324 by Nara Mccann RN  Outcome: Progressing  2/26/2024 2309 by Nara Mccann RN  Outcome: Progressing  Goal: No changes in neurological exam by end of shift  2/26/2024 2324 by Nara Mccann RN  Outcome: Progressing  2/26/2024 2309 by Nara Mccann RN  Outcome: Progressing  Goal: Learn about and adhere to nutrition recommendations by end of shift  2/26/2024 2324 by Nara Mccann RN  Outcome: Progressing  2/26/2024 2309 by Nara Mccann RN  Outcome: Progressing  Goal: Vital signs within normal range for age by end of shift  2/26/2024 2324 by Nara Mccann RN  Outcome: Progressing  2/26/2024 2309 by Nara Mccann RN  Outcome: Progressing  Goal: Increase self care and/or family involovement by end of shift  2/26/2024 2324 by Nara Mccann RN  Outcome: Progressing  2/26/2024 2309 by Nara Mccann RN  Outcome: Progressing  Goal: Receive DSME education by end of shift  2/26/2024 2324 by Nara Mccann RN  Outcome: Progressing  2/26/2024 2309 by Nara Mccann RN  Outcome: Progressing     Problem: Respiratory  Goal: Clear secretions with interventions this shift  2/26/2024 2324 by Nara Mccann RN  Outcome: Progressing  2/26/2024 2309 by Nara Mccann RN  Outcome: Progressing  Goal: Minimize anxiety/maximize coping throughout shift  2/26/2024 2324 by Nara Mccann RN  Outcome: Progressing  2/26/2024 2309 by Nara Mccann RN  Outcome: Progressing  Goal: Minimal/no exertional discomfort or dyspnea this shift  2/26/2024 2324 by Nara Mccann RN  Outcome: Progressing  2/26/2024 2309 by Nara Mccann RN  Outcome: Progressing  Goal: No signs of respiratory distress (eg. Use of accessory muscles. Peds grunting)  2/26/2024 2324 by Nara Mccann RN  Outcome: Progressing  2/26/2024 2309 by Nara Mccann, RN  Outcome: Progressing  Goal: Tolerate pulmonary toileting this  shift  2/26/2024 2324 by Nara Mccann RN  Outcome: Progressing  2/26/2024 2309 by Nara Mccann RN  Outcome: Progressing  Goal: Verbalize decreased shortness of breath this shift  2/26/2024 2324 by Nara Mccann RN  Outcome: Progressing  2/26/2024 2309 by Naar Mccann RN  Outcome: Progressing  Goal: Wean oxygen to maintain O2 saturation per order/standard this shift  2/26/2024 2324 by Nara Mccann RN  Outcome: Progressing  2/26/2024 2309 by Nara Mccann RN  Outcome: Progressing  Goal: Increase self care and/or family involvement in next 24 hours  2/26/2024 2324 by Nara Mccann RN  Outcome: Progressing  2/26/2024 2309 by Nara Mccann RN  Outcome: Progressing     Problem: Pain  Goal: My pain/discomfort is manageable  2/26/2024 2324 by Nara Mccann RN  Outcome: Progressing  2/26/2024 2309 by Nara Mccann RN  Outcome: Progressing     Problem: Safety  Goal: Patient will be injury free during hospitalization  2/26/2024 2324 by Nara Mccann RN  Outcome: Progressing  2/26/2024 2309 by Nara Mccann RN  Outcome: Progressing  Goal: I will remain free of falls  2/26/2024 2324 by Nara Mccann RN  Outcome: Progressing  2/26/2024 2309 by Nara Mccann RN  Outcome: Progressing     Problem: Discharge Planning  Goal: Discharge to home or other facility with appropriate resources  2/26/2024 2324 by Nara Mccann RN  Outcome: Progressing  2/26/2024 2309 by Nara Mccann RN  Outcome: Progressing     Problem: Chronic Conditions and Co-morbidities  Goal: Patient's chronic conditions and co-morbidity symptoms are monitored and maintained or improved  2/26/2024 2324 by Nara Mccann RN  Outcome: Progressing  2/26/2024 2309 by Nara Mccann RN  Outcome: Progressing     Problem: Pain  Goal: Takes deep breaths with improved pain control throughout the shift  2/26/2024 2324 by Nara Mccann RN  Outcome:  Progressing  2/26/2024 2309 by Nara Mccann RN  Outcome: Progressing  Goal: Turns in bed with improved pain control throughout the shift  2/26/2024 2324 by Nara Mccann RN  Outcome: Progressing  2/26/2024 2309 by Nara Mccann RN  Outcome: Progressing  Goal: Walks with improved pain control throughout the shift  2/26/2024 2324 by Nara Mccann RN  Outcome: Progressing  2/26/2024 2309 by Nara Mccann RN  Outcome: Progressing  Goal: Performs ADL's with improved pain control throughout shift  2/26/2024 2324 by Nara Mccann RN  Outcome: Progressing  2/26/2024 2309 by Nara Mccann RN  Outcome: Progressing  Goal: Participates in PT with improved pain control throughout the shift  2/26/2024 2324 by Nara Mccann RN  Outcome: Progressing  2/26/2024 2309 by Nara Mccann RN  Outcome: Progressing  Goal: Free from opioid side effects throughout the shift  2/26/2024 2324 by Nara Mccann RN  Outcome: Progressing  2/26/2024 2309 by Nara Mccann RN  Outcome: Progressing  Goal: Free from acute confusion related to pain meds throughout the shift  2/26/2024 2324 by Nara Mccann RN  Outcome: Progressing  2/26/2024 2309 by Nara Mccann RN  Outcome: Progressing   The patient's goals for the shift include      The clinical goals for the shift include Control pain    Over the shift, the patient did not make progress toward the following goals. Barriers to progression include . Recommendations to address these barriers include .

## 2024-02-27 NOTE — PROGRESS NOTES
Jarret Bledsoe is a 64 y.o. male on day 3 of admission presenting with Pneumonia of both lungs due to infectious organism, unspecified part of lung.      Subjective   Jarret is seen in his room in no acute distress. Rectal tube and hernandez catheter are noted. He has had decreased bowel movements overnight. He continues on rocephin and zithromax for PNA and RSV. IVF /hr. He expressed that he has not been out of bed since admission and author encouraged him to get into a chair with assistance. He is 97% on oxygen 2 liters. Note right high AKA. Will plan to transition to oral antibiotics and plan for discharge in the am to return to assisted living on 2/28/24.       Objective   Physical Exam  Constitutional:       General: He is not in acute distress.     Appearance: Normal appearance. He is not toxic-appearing.   HENT:      Head: Normocephalic and atraumatic.      Mouth/Throat:      Mouth: Mucous membranes are moist.   Eyes:      Extraocular Movements: Extraocular movements intact.      Pupils: Pupils are equal, round, and reactive to light.   Cardiovascular:      Rate and Rhythm: Normal rate and regular rhythm.      Pulses: Normal pulses.      Heart sounds: Normal heart sounds. No murmur heard.     No gallop.   Pulmonary:      Effort: Pulmonary effort is normal. No respiratory distress.      Breath sounds: Wheezing and rhonchi present. No rales.   Abdominal:      General: Bowel sounds are normal. There is no distension.      Palpations: Abdomen is soft.      Tenderness: There is no abdominal tenderness. There is no guarding.   GI/:     Hernandez patent for clear yellow liquid     Note fecal management system with small amount liquid stool.   Musculoskeletal:         General: No swelling or tenderness. Normal range of motion.      Cervical back: Normal range of motion and neck supple.      Left lower leg: No edema.   Skin:     General: Skin is warm and dry.      Capillary Refill: Capillary refill takes less than 2  seconds.      Coloration: Skin is not jaundiced.      Findings: No bruising or rash.   Neurological:      General: No focal deficit present.      Mental Status: He is alert and oriented to person, place, and time.      Cranial Nerves: No cranial nerve deficit.      Sensory: No sensory deficit.      Motor: No weakness.      Gait: Gait normal.   Psychiatric:         Mood and Affect: Mood normal.         Behavior: Behavior normal.         Thought Content: Thought content normal.         Judgment: Judgment normal.    Last Recorded Vitals  /61 (BP Location: Right arm, Patient Position: Lying)   Pulse 67   Temp 37.4 °C (99.3 °F) (Temporal)   Resp 18   Wt 86 kg (189 lb 8 oz)   SpO2 95%   Intake/Output last 3 Shifts:    Intake/Output Summary (Last 24 hours) at 2/27/2024 1518  Last data filed at 2/27/2024 0630  Gross per 24 hour   Intake 1508.34 ml   Output 1200 ml   Net 308.34 ml       Admission Weight  Weight: 82.2 kg (181 lb 3.5 oz) (02/24/24 1639)    Daily Weight  02/27/24 : 86 kg (189 lb 8 oz)    Image Results  ECG 12 lead  Sinus tachycardia  Left axis deviation  Inferior infarct , age undetermined  Abnormal ECG  When compared with ECG of 17-OCT-2005 12:53,  QRS axis Shifted left  Inferior infarct is now Present  Nonspecific T wave abnormality no longer evident in Anterolateral leads    Results for orders placed or performed during the hospital encounter of 02/24/24 (from the past 96 hour(s))   CBC and Auto Differential   Result Value Ref Range    WBC 11.7 (H) 4.4 - 11.3 x10*3/uL    nRBC 0.0 0.0 - 0.0 /100 WBCs    RBC 4.36 (L) 4.50 - 5.90 x10*6/uL    Hemoglobin 14.0 13.5 - 17.5 g/dL    Hematocrit 43.5 41.0 - 52.0 %     80 - 100 fL    MCH 32.1 26.0 - 34.0 pg    MCHC 32.2 32.0 - 36.0 g/dL    RDW 15.7 (H) 11.5 - 14.5 %    Platelets 183 150 - 450 x10*3/uL    Neutrophils % 78.7 40.0 - 80.0 %    Immature Granulocytes %, Automated 0.4 0.0 - 0.9 %    Lymphocytes % 11.3 13.0 - 44.0 %    Monocytes % 8.9 2.0 -  10.0 %    Eosinophils % 0.4 0.0 - 6.0 %    Basophils % 0.3 0.0 - 2.0 %    Neutrophils Absolute 9.24 (H) 1.20 - 7.70 x10*3/uL    Immature Granulocytes Absolute, Automated 0.05 0.00 - 0.70 x10*3/uL    Lymphocytes Absolute 1.33 1.20 - 4.80 x10*3/uL    Monocytes Absolute 1.04 (H) 0.10 - 1.00 x10*3/uL    Eosinophils Absolute 0.05 0.00 - 0.70 x10*3/uL    Basophils Absolute 0.03 0.00 - 0.10 x10*3/uL   Comprehensive Metabolic Panel   Result Value Ref Range    Glucose 186 (H) 74 - 99 mg/dL    Sodium 132 (L) 136 - 145 mmol/L    Potassium 5.7 (H) 3.5 - 5.3 mmol/L    Chloride 99 98 - 107 mmol/L    Bicarbonate 24 21 - 32 mmol/L    Anion Gap 15 10 - 20 mmol/L    Urea Nitrogen 71 (H) 6 - 23 mg/dL    Creatinine 2.92 (H) 0.50 - 1.30 mg/dL    eGFR 23 (L) >60 mL/min/1.73m*2    Calcium 9.5 8.6 - 10.3 mg/dL    Albumin 4.0 3.4 - 5.0 g/dL    Alkaline Phosphatase 62 33 - 136 U/L    Total Protein 7.3 6.4 - 8.2 g/dL    AST 29 9 - 39 U/L    Bilirubin, Total 0.4 0.0 - 1.2 mg/dL    ALT 33 10 - 52 U/L   Lactate   Result Value Ref Range    Lactate 1.8 0.4 - 2.0 mmol/L   Blood Culture    Specimen: Peripheral Venipuncture; Blood culture   Result Value Ref Range    Blood Culture No growth at 2 days    Blood Culture    Specimen: Peripheral Venipuncture; Blood culture   Result Value Ref Range    Blood Culture No growth at 2 days    MRSA Surveillance for Vancomycin De-escalation, PCR    Specimen: Anterior Nares; Swab   Result Value Ref Range    MRSA PCR Not Detected Not Detected   Sars-CoV-2 and Influenza A/B PCR   Result Value Ref Range    Flu A Result Not Detected Not Detected    Flu B Result Not Detected Not Detected    Coronavirus 2019, PCR Not Detected Not Detected   RSV PCR   Result Value Ref Range    RSV PCR Detected (A) Not Detected   Potassium   Result Value Ref Range    Potassium 4.7 3.5 - 5.3 mmol/L   Procalcitonin   Result Value Ref Range    Procalcitonin 1.25 (H) <=0.07 ng/mL   Magnesium   Result Value Ref Range    Magnesium 1.76 1.60 -  2.40 mg/dL   POCT GLUCOSE   Result Value Ref Range    POCT Glucose 131 (H) 74 - 99 mg/dL   Lactate   Result Value Ref Range    Lactate 0.8 0.4 - 2.0 mmol/L   B-type natriuretic peptide   Result Value Ref Range    BNP 51 0 - 99 pg/mL   Lavender Top   Result Value Ref Range    Extra Tube Hold for add-ons.    SST TOP   Result Value Ref Range    Extra Tube Hold for add-ons.    PST Top   Result Value Ref Range    Extra Tube Hold for add-ons.    Basic Metabolic Panel   Result Value Ref Range    Glucose 170 (H) 74 - 99 mg/dL    Sodium 135 (L) 136 - 145 mmol/L    Potassium 4.3 3.5 - 5.3 mmol/L    Chloride 104 98 - 107 mmol/L    Bicarbonate 21 21 - 32 mmol/L    Anion Gap 14 10 - 20 mmol/L    Urea Nitrogen 64 (H) 6 - 23 mg/dL    Creatinine 3.03 (H) 0.50 - 1.30 mg/dL    eGFR 22 (L) >60 mL/min/1.73m*2    Calcium 7.5 (L) 8.6 - 10.3 mg/dL   CBC   Result Value Ref Range    WBC 8.2 4.4 - 11.3 x10*3/uL    nRBC 0.0 0.0 - 0.0 /100 WBCs    RBC 3.73 (L) 4.50 - 5.90 x10*6/uL    Hemoglobin 11.9 (L) 13.5 - 17.5 g/dL    Hematocrit 38.1 (L) 41.0 - 52.0 %     (H) 80 - 100 fL    MCH 31.9 26.0 - 34.0 pg    MCHC 31.2 (L) 32.0 - 36.0 g/dL    RDW 15.9 (H) 11.5 - 14.5 %    Platelets 155 150 - 450 x10*3/uL   POCT GLUCOSE   Result Value Ref Range    POCT Glucose 141 (H) 74 - 99 mg/dL   Urinalysis with Reflex Culture and Microscopic   Result Value Ref Range    Color, Urine Yellow Straw, Yellow    Appearance, Urine Clear Clear    Specific Gravity, Urine 1.015 1.005 - 1.035    pH, Urine 5.0 5.0, 5.5, 6.0, 6.5, 7.0, 7.5, 8.0    Protein, Urine 30 (1+) (N) NEGATIVE mg/dL    Glucose, Urine NEGATIVE NEGATIVE mg/dL    Blood, Urine MODERATE (2+) (A) NEGATIVE    Ketones, Urine NEGATIVE NEGATIVE mg/dL    Bilirubin, Urine NEGATIVE NEGATIVE    Urobilinogen, Urine <2.0 <2.0 mg/dL    Nitrite, Urine NEGATIVE NEGATIVE    Leukocyte Esterase, Urine NEGATIVE NEGATIVE   Extra Urine Gray Tube   Result Value Ref Range    Extra Tube Hold for add-ons.    Legionella  Antigen, Urine    Specimen: Urine   Result Value Ref Range    L. pneumophila Urine Ag Negative Negative   Urinalysis Microscopic   Result Value Ref Range    WBC, Urine 1-5 1-5, NONE /HPF    RBC, Urine 1-2 NONE, 1-2, 3-5 /HPF    Amorphous Crystals, Urine 1+ NONE, 1+, 2+ /HPF   POCT GLUCOSE   Result Value Ref Range    POCT Glucose 235 (H) 74 - 99 mg/dL   Vancomycin   Result Value Ref Range    Vancomycin 18.3 5.0 - 20.0 ug/mL   POCT GLUCOSE   Result Value Ref Range    POCT Glucose 188 (H) 74 - 99 mg/dL   POCT GLUCOSE   Result Value Ref Range    POCT Glucose 238 (H) 74 - 99 mg/dL   SST TOP   Result Value Ref Range    Extra Tube Hold for add-ons.    CBC   Result Value Ref Range    WBC 7.8 4.4 - 11.3 x10*3/uL    nRBC 0.0 0.0 - 0.0 /100 WBCs    RBC 3.88 (L) 4.50 - 5.90 x10*6/uL    Hemoglobin 12.5 (L) 13.5 - 17.5 g/dL    Hematocrit 40.8 (L) 41.0 - 52.0 %     (H) 80 - 100 fL    MCH 32.2 26.0 - 34.0 pg    MCHC 30.6 (L) 32.0 - 36.0 g/dL    RDW 16.1 (H) 11.5 - 14.5 %    Platelets 152 150 - 450 x10*3/uL   Basic Metabolic Panel   Result Value Ref Range    Glucose 128 (H) 74 - 99 mg/dL    Sodium 136 136 - 145 mmol/L    Potassium 4.1 3.5 - 5.3 mmol/L    Chloride 105 98 - 107 mmol/L    Bicarbonate 21 21 - 32 mmol/L    Anion Gap 14 10 - 20 mmol/L    Urea Nitrogen 59 (H) 6 - 23 mg/dL    Creatinine 2.96 (H) 0.50 - 1.30 mg/dL    eGFR 23 (L) >60 mL/min/1.73m*2    Calcium 7.6 (L) 8.6 - 10.3 mg/dL   POCT GLUCOSE   Result Value Ref Range    POCT Glucose 146 (H) 74 - 99 mg/dL   POCT GLUCOSE   Result Value Ref Range    POCT Glucose 224 (H) 74 - 99 mg/dL   C. difficile, PCR    Specimen: Stool   Result Value Ref Range    C. difficile, PCR Not Detected Not Detected   ECG 12 lead   Result Value Ref Range    Ventricular Rate 119 BPM    Atrial Rate 119 BPM    IN Interval 134 ms    QRS Duration 82 ms    QT Interval 318 ms    QTC Calculation(Bazett) 447 ms    P Axis 50 degrees    R Axis -44 degrees    T Axis 66 degrees    QRS Count 19 beats     Q Onset 215 ms    P Onset 148 ms    P Offset 206 ms    T Offset 374 ms    QTC Fredericia 399 ms   POCT GLUCOSE   Result Value Ref Range    POCT Glucose 248 (H) 74 - 99 mg/dL   POCT GLUCOSE   Result Value Ref Range    POCT Glucose 198 (H) 74 - 99 mg/dL   CBC   Result Value Ref Range    WBC 5.6 4.4 - 11.3 x10*3/uL    nRBC 0.0 0.0 - 0.0 /100 WBCs    RBC 3.74 (L) 4.50 - 5.90 x10*6/uL    Hemoglobin 12.0 (L) 13.5 - 17.5 g/dL    Hematocrit 37.5 (L) 41.0 - 52.0 %     80 - 100 fL    MCH 32.1 26.0 - 34.0 pg    MCHC 32.0 32.0 - 36.0 g/dL    RDW 16.1 (H) 11.5 - 14.5 %    Platelets 148 (L) 150 - 450 x10*3/uL   Basic Metabolic Panel   Result Value Ref Range    Glucose 199 (H) 74 - 99 mg/dL    Sodium 137 136 - 145 mmol/L    Potassium 4.0 3.5 - 5.3 mmol/L    Chloride 110 (H) 98 - 107 mmol/L    Bicarbonate 19 (L) 21 - 32 mmol/L    Anion Gap 12 10 - 20 mmol/L    Urea Nitrogen 55 (H) 6 - 23 mg/dL    Creatinine 2.55 (H) 0.50 - 1.30 mg/dL    eGFR 27 (L) >60 mL/min/1.73m*2    Calcium 7.4 (L) 8.6 - 10.3 mg/dL   Magnesium   Result Value Ref Range    Magnesium 1.82 1.60 - 2.40 mg/dL   SST TOP   Result Value Ref Range    Extra Tube Hold for add-ons.    POCT GLUCOSE   Result Value Ref Range    POCT Glucose 181 (H) 74 - 99 mg/dL   POCT GLUCOSE   Result Value Ref Range    POCT Glucose 252 (H) 74 - 99 mg/dL     Scheduled medications  amoxicillin-pot clavulanate, 1 tablet, oral, q12h YELENA  atorvastatin, 10 mg, oral, Daily  bisoprolol, 5 mg, oral, Daily  enoxaparin, 30 mg, subcutaneous, q24h  ergocalciferol, 1,250 mcg, oral, Weekly  [Held by provider] furosemide, 20 mg, oral, Daily  gabapentin, 300 mg, oral, BID  guaiFENesin, 600 mg, oral, BID  insulin glargine, 10 Units, subcutaneous, BID  insulin lispro, 0-10 Units, subcutaneous, TID with meals  ipratropium-albuteroL, 3 mL, nebulization, TID  [Held by provider] lisinopril, 5 mg, oral, Daily  pantoprazole, 20 mg, oral, Daily before breakfast  primidone, 100 mg, oral, Nightly  primidone, 150  mg, oral, Daily  primidone, 50 mg, oral, Daily with lunch  sodium bicarbonate, 650 mg, oral, BID      Continuous medications  sodium chloride 0.9%, 100 mL/hr, Last Rate: 100 mL/hr (02/27/24 1223)      PRN medications  PRN medications: acetaminophen, albuterol, dextrose 10 % in water (D10W), dextrose, diclofenac sodium, glucagon, HYDROcodone-acetaminophen, loratadine, meloxicam, oxygen, tiZANidine      Assessment/Plan                  Principal Problem:    Pneumonia of both lungs due to infectious organism, unspecified part of lung  Active Problems:    Anemia    Arthritis    CKD (chronic kidney disease)    COPD (chronic obstructive pulmonary disease) (CMS/[facility])    Dyslipidemia    HTN (hypertension)    History of GI bleed    Type 2 diabetes mellitus (CMS/[facility])    Essential tremor    Peripheral neuropathy    RSV (acute bronchiolitis due to respiratory syncytial virus)  Pneumonia of both lungs due to infectious organism, unspecified part of lung  COPD (chronic obstructive pulmonary disease) (CMS/[facility])  RSV  - RSV +  - CT chest: Basilar ground glass opacities superimposed on chronic changes and atelectasis.  Please correlate for component of pneumonia.  Basilar bronchiectasis and bronchial wall thickening suggesting acute bronchitis.  There are subtle pulmonary nodules.   - WBC 11.7 > 8.2 > 7.8  - Given azith, ceftx, vanco in ED  - continue oral azith, IV ceftriaxone, day 3  - bronchodilators scheduled, PRN  - RT consult, on NC currently 3 liters, RUBY, is not on chronic O2  - follow CBC, cultures  ~transition to oral antibiotic therapy     Dyslipidemia  HTN (hypertension)  - continue atorvastatin, bisoprolol  - monitor BP, HR     Diarrhea   - started 2/25 evening  - multiple episodes  - Cdiff pending  - shakes ordered     History of GI bleed  Anemia  - H/H 14/43.5 > 11.9/38.1 > 12.5/40.8  - trend H/H daily  - if declines further, guiac stool as needed     CKD (chronic kidney disease)  - creat 2.92 > 3.03 > 2.96  - continue  sodium bicarb  - gentle hydration  - trend creat  - renally dose meds as needed  - avoid nephrotoxins     Type 2 diabetes mellitus (CMS/[facility])  Essential Tremor  Peripheral neuropathy  Arthritis  - continue gabapentin, primidone,   - accuchecks with SS  - continue lantus     GI ppx: PPI  DVT ppx: lovenox     Fluids: as needed  Electrolytes: replace as needed  Nutrition: carb control  Adjuncts: PIV     Code Status: full  Pt requires inpatient stay at this time.;  Total accumulated time spent face to face and not face to face preparing to see the patient, obtaining and reviewing separately obtained history; performing a medically appropriate examination and/or evaluation; counseling and educating the patient, family; ordering medications, tests, or procedures; referring and communicating with other health care professionals; documenting clinical information in the patient's medical record; independently interpreting results and communicating the results to the patient, family; and care coordination was 25 minutes.              Ofe Farrell, APRN-CNP

## 2024-02-27 NOTE — CARE PLAN
Problem: Respiratory  Goal: No signs of respiratory distress (eg. Use of accessory muscles. Peds grunting)  Outcome: Progressing   The patient's goals for the shift include

## 2024-02-27 NOTE — CARE PLAN
The patient's goals for the shift include      The clinical goals for the shift include Control pain        Problem: Respiratory  Goal: Minimal/no exertional discomfort or dyspnea this shift  Outcome: Progressing     Problem: Chronic Conditions and Co-morbidities  Goal: Patient's chronic conditions and co-morbidity symptoms are monitored and maintained or improved  Outcome: Progressing    SpO2 remained > 90% 2L  ATB route changed to oral. No stools noted.

## 2024-02-28 VITALS
OXYGEN SATURATION: 94 % | TEMPERATURE: 97.9 F | RESPIRATION RATE: 16 BRPM | BODY MASS INDEX: 30.24 KG/M2 | HEIGHT: 67 IN | SYSTOLIC BLOOD PRESSURE: 185 MMHG | DIASTOLIC BLOOD PRESSURE: 88 MMHG | WEIGHT: 192.68 LBS | HEART RATE: 70 BPM

## 2024-02-28 LAB
ANION GAP SERPL CALC-SCNC: 12 MMOL/L (ref 10–20)
BUN SERPL-MCNC: 38 MG/DL (ref 6–23)
CALCIUM SERPL-MCNC: 7.3 MG/DL (ref 8.6–10.3)
CHLORIDE SERPL-SCNC: 113 MMOL/L (ref 98–107)
CO2 SERPL-SCNC: 19 MMOL/L (ref 21–32)
CREAT SERPL-MCNC: 1.87 MG/DL (ref 0.5–1.3)
EGFRCR SERPLBLD CKD-EPI 2021: 40 ML/MIN/1.73M*2
ERYTHROCYTE [DISTWIDTH] IN BLOOD BY AUTOMATED COUNT: 15.9 % (ref 11.5–14.5)
GLUCOSE BLD MANUAL STRIP-MCNC: 165 MG/DL (ref 74–99)
GLUCOSE BLD MANUAL STRIP-MCNC: 246 MG/DL (ref 74–99)
GLUCOSE BLD MANUAL STRIP-MCNC: 266 MG/DL (ref 74–99)
GLUCOSE BLD MANUAL STRIP-MCNC: 283 MG/DL (ref 74–99)
GLUCOSE SERPL-MCNC: 177 MG/DL (ref 74–99)
HCT VFR BLD AUTO: 35.4 % (ref 41–52)
HGB BLD-MCNC: 11.4 G/DL (ref 13.5–17.5)
HOLD SPECIMEN: NORMAL
MCH RBC QN AUTO: 32.3 PG (ref 26–34)
MCHC RBC AUTO-ENTMCNC: 32.2 G/DL (ref 32–36)
MCV RBC AUTO: 100 FL (ref 80–100)
NRBC BLD-RTO: 0 /100 WBCS (ref 0–0)
PLATELET # BLD AUTO: 165 X10*3/UL (ref 150–450)
POTASSIUM SERPL-SCNC: 4.4 MMOL/L (ref 3.5–5.3)
RBC # BLD AUTO: 3.53 X10*6/UL (ref 4.5–5.9)
SODIUM SERPL-SCNC: 140 MMOL/L (ref 136–145)
WBC # BLD AUTO: 4.9 X10*3/UL (ref 4.4–11.3)

## 2024-02-28 PROCEDURE — 36415 COLL VENOUS BLD VENIPUNCTURE: CPT | Mod: IPSPLIT | Performed by: NURSE PRACTITIONER

## 2024-02-28 PROCEDURE — 94640 AIRWAY INHALATION TREATMENT: CPT | Mod: IPSPLIT

## 2024-02-28 PROCEDURE — 2500000001 HC RX 250 WO HCPCS SELF ADMINISTERED DRUGS (ALT 637 FOR MEDICARE OP): Mod: IPSPLIT | Performed by: NURSE PRACTITIONER

## 2024-02-28 PROCEDURE — 2500000002 HC RX 250 W HCPCS SELF ADMINISTERED DRUGS (ALT 637 FOR MEDICARE OP, ALT 636 FOR OP/ED): Mod: IPSPLIT | Performed by: NURSE PRACTITIONER

## 2024-02-28 PROCEDURE — 80048 BASIC METABOLIC PNL TOTAL CA: CPT | Mod: IPSPLIT | Performed by: NURSE PRACTITIONER

## 2024-02-28 PROCEDURE — 2500000004 HC RX 250 GENERAL PHARMACY W/ HCPCS (ALT 636 FOR OP/ED): Mod: IPSPLIT | Performed by: NURSE PRACTITIONER

## 2024-02-28 PROCEDURE — 85027 COMPLETE CBC AUTOMATED: CPT | Mod: IPSPLIT | Performed by: NURSE PRACTITIONER

## 2024-02-28 PROCEDURE — 99232 SBSQ HOSP IP/OBS MODERATE 35: CPT | Performed by: NURSE PRACTITIONER

## 2024-02-28 PROCEDURE — 97162 PT EVAL MOD COMPLEX 30 MIN: CPT | Mod: GP,IPSPLIT | Performed by: PHYSICAL THERAPIST

## 2024-02-28 PROCEDURE — 82947 ASSAY GLUCOSE BLOOD QUANT: CPT | Mod: IPSPLIT

## 2024-02-28 PROCEDURE — 97530 THERAPEUTIC ACTIVITIES: CPT | Mod: GP,IPSPLIT | Performed by: PHYSICAL THERAPIST

## 2024-02-28 RX ORDER — AMOXICILLIN AND CLAVULANATE POTASSIUM 875; 125 MG/1; MG/1
1 TABLET, FILM COATED ORAL EVERY 12 HOURS SCHEDULED
Qty: 7 TABLET | Refills: 0 | Status: SHIPPED | OUTPATIENT
Start: 2024-02-28 | End: 2024-03-03

## 2024-02-28 RX ADMIN — SODIUM BICARBONATE 650 MG TABLET 650 MG: at 09:11

## 2024-02-28 RX ADMIN — INSULIN LISPRO 2 UNITS: 100 INJECTION, SOLUTION INTRAVENOUS; SUBCUTANEOUS at 09:07

## 2024-02-28 RX ADMIN — PRIMIDONE 150 MG: 50 TABLET ORAL at 09:11

## 2024-02-28 RX ADMIN — AMOXICILLIN AND CLAVULANATE POTASSIUM 1 TABLET: 875; 125 TABLET, FILM COATED ORAL at 09:11

## 2024-02-28 RX ADMIN — IPRATROPIUM BROMIDE AND ALBUTEROL SULFATE 3 ML: 2.5; .5 SOLUTION RESPIRATORY (INHALATION) at 15:55

## 2024-02-28 RX ADMIN — GABAPENTIN 300 MG: 300 CAPSULE ORAL at 20:39

## 2024-02-28 RX ADMIN — INSULIN GLARGINE 10 UNITS: 100 INJECTION, SOLUTION SUBCUTANEOUS at 09:12

## 2024-02-28 RX ADMIN — ENOXAPARIN SODIUM 30 MG: 30 INJECTION SUBCUTANEOUS at 20:39

## 2024-02-28 RX ADMIN — SODIUM BICARBONATE 650 MG TABLET 650 MG: at 20:39

## 2024-02-28 RX ADMIN — PRIMIDONE 50 MG: 50 TABLET ORAL at 12:18

## 2024-02-28 RX ADMIN — GUAIFENESIN 600 MG: 600 TABLET, EXTENDED RELEASE ORAL at 20:39

## 2024-02-28 RX ADMIN — ATORVASTATIN CALCIUM 10 MG: 10 TABLET, FILM COATED ORAL at 20:39

## 2024-02-28 RX ADMIN — BISOPROLOL FUMARATE 5 MG: 5 TABLET ORAL at 09:11

## 2024-02-28 RX ADMIN — GABAPENTIN 300 MG: 300 CAPSULE ORAL at 09:11

## 2024-02-28 RX ADMIN — PANTOPRAZOLE SODIUM 20 MG: 20 TABLET, DELAYED RELEASE ORAL at 06:08

## 2024-02-28 RX ADMIN — INSULIN LISPRO 4 UNITS: 100 INJECTION, SOLUTION INTRAVENOUS; SUBCUTANEOUS at 17:10

## 2024-02-28 RX ADMIN — PRIMIDONE 100 MG: 50 TABLET ORAL at 20:39

## 2024-02-28 RX ADMIN — INSULIN LISPRO 6 UNITS: 100 INJECTION, SOLUTION INTRAVENOUS; SUBCUTANEOUS at 12:18

## 2024-02-28 RX ADMIN — INSULIN GLARGINE 10 UNITS: 100 INJECTION, SOLUTION SUBCUTANEOUS at 20:51

## 2024-02-28 RX ADMIN — GUAIFENESIN 600 MG: 600 TABLET, EXTENDED RELEASE ORAL at 09:11

## 2024-02-28 RX ADMIN — IPRATROPIUM BROMIDE AND ALBUTEROL SULFATE 3 ML: 2.5; .5 SOLUTION RESPIRATORY (INHALATION) at 21:07

## 2024-02-28 RX ADMIN — AMOXICILLIN AND CLAVULANATE POTASSIUM 1 TABLET: 875; 125 TABLET, FILM COATED ORAL at 20:39

## 2024-02-28 RX ADMIN — IPRATROPIUM BROMIDE AND ALBUTEROL SULFATE 3 ML: 2.5; .5 SOLUTION RESPIRATORY (INHALATION) at 08:51

## 2024-02-28 ASSESSMENT — COGNITIVE AND FUNCTIONAL STATUS - GENERAL
MOBILITY SCORE: 10
STANDING UP FROM CHAIR USING ARMS: A LOT
MOVING FROM LYING ON BACK TO SITTING ON SIDE OF FLAT BED WITH BEDRAILS: A LITTLE
WALKING IN HOSPITAL ROOM: TOTAL
CLIMB 3 TO 5 STEPS WITH RAILING: TOTAL
MOVING FROM LYING ON BACK TO SITTING ON SIDE OF FLAT BED WITH BEDRAILS: A LOT
MOBILITY SCORE: 12
DRESSING REGULAR UPPER BODY CLOTHING: A LITTLE
CLIMB 3 TO 5 STEPS WITH RAILING: TOTAL
MOVING TO AND FROM BED TO CHAIR: A LOT
STANDING UP FROM CHAIR USING ARMS: A LOT
WALKING IN HOSPITAL ROOM: TOTAL
MOVING TO AND FROM BED TO CHAIR: A LOT
TURNING FROM BACK TO SIDE WHILE IN FLAT BAD: A LOT
PERSONAL GROOMING: A LITTLE
DAILY ACTIVITIY SCORE: 16
EATING MEALS: A LITTLE
HELP NEEDED FOR BATHING: A LITTLE
DRESSING REGULAR LOWER BODY CLOTHING: A LOT
TOILETING: A LOT
TURNING FROM BACK TO SIDE WHILE IN FLAT BAD: A LITTLE

## 2024-02-28 ASSESSMENT — PAIN SCALES - GENERAL
PAINLEVEL_OUTOF10: 9
PAINLEVEL_OUTOF10: 0 - NO PAIN

## 2024-02-28 ASSESSMENT — PAIN DESCRIPTION - DESCRIPTORS: DESCRIPTORS: CRAMPING;ACHING

## 2024-02-28 ASSESSMENT — PAIN - FUNCTIONAL ASSESSMENT: PAIN_FUNCTIONAL_ASSESSMENT: 0-10

## 2024-02-28 NOTE — DISCHARGE SUMMARY
Discharge Diagnosis  Community Acquired bacterial pneumonia  COPD exacerbation  RSV  Diarrhea    Issues Requiring Follow-Up      Discharge Meds     Your medication list        START taking these medications        Instructions Last Dose Given Next Dose Due   amoxicillin-pot clavulanate 875-125 mg tablet  Commonly known as: Augmentin      Take 1 tablet by mouth every 12 hours for 7 doses.              CONTINUE taking these medications        Instructions Last Dose Given Next Dose Due   acetaminophen 500 mg tablet  Commonly known as: Tylenol           atorvastatin 10 mg tablet  Commonly known as: Lipitor           bisoprolol 5 mg tablet  Commonly known as: Zebeta           calcium carbonate-vitamin D3 500 mg-5 mcg (200 unit) tablet           ergocalciferol 1.25 MG (56073 UT) capsule  Commonly known as: Vitamin D-2           furosemide 20 mg tablet  Commonly known as: Lasix           gabapentin 300 mg capsule  Commonly known as: Neurontin           guaiFENesin 600 mg 12 hr tablet  Commonly known as: Mucinex           HumaLOG Mix 50-50 KwikPen 100 unit/mL (50-50) injection  Generic drug: insulin lispro protamin-lispro           HYDROcodone-acetaminophen 5-325 mg tablet  Commonly known as: Norco           Lantus U-100 Insulin 100 unit/mL injection  Generic drug: insulin glargine           Lantus U-100 Insulin 100 unit/mL injection  Generic drug: insulin glargine           lisinopril 5 mg tablet           loperamide 2 mg capsule  Commonly known as: Imodium           loratadine 10 mg tablet  Commonly known as: Claritin           meloxicam 15 mg tablet  Commonly known as: Mobic           ofloxacin 0.3 % otic solution  Commonly known as: Floxin           pantoprazole 20 mg EC tablet  Commonly known as: ProtoNix           primidone 50 mg tablet  Commonly known as: Mysoline           primidone 50 mg tablet  Commonly known as: Mysoline           primidone 50 mg tablet  Commonly known as: Mysoline           sodium bicarbonate  650 mg tablet           tiZANidine 4 mg capsule  Commonly known as: Zanaflex                     Where to Get Your Medications        These medications were sent to [facility] - [place] - [address] [address]  [address], [place] [address]      Phone: [phone]   amoxicillin-pot clavulanate 875-125 mg tablet         Test Results Pending At Discharge  Pending Labs       Order Current Status    Blood Culture Preliminary result    Blood Culture Preliminary result        64 y.o. male with PMH of diabetes, HTN, CKD who presented to ED 2/24 from [facility] with c/o cough, fever, body aches, chills and SOB for a few days. He has been hypoxic as well for the past few days and needed oxygen support, 3 liters NC. On arrival to the ED he was febrile to 39.3 degrees, tachycardic with , and 93% on 3 liters. Further workup showed Na 132, K 5.7, Bun/creat 71/2.92, WBC 11.7. He was also found to be RSV+. CT chest showed basilar ground glass opacities, basilar bronchiectasis and bronchial wall thickening suggesting acute bronchitis. He was given IVF, IV antibiotics were started and he was admitted to Medicine for further evaluation and treatment.        Hospital Course  Pneumonia of both lungs due to infectious organism, unspecified part of lung  COPD (chronic obstructive pulmonary disease) (CMS/[facility])  RSV  - RSV +  - CT chest: Basilar ground glass opacities superimposed on chronic changes and atelectasis.  Please correlate for component of pneumonia.  Basilar bronchiectasis and bronchial wall thickening suggesting acute bronchitis.  There are subtle pulmonary nodules.   - WBC 11.7 > 8.2 > 7.8  - Given azith, ceftx, vanco in ED  - continue oral azith, IV ceftriaxone, day 3  - bronchodilators scheduled, PRN  - RT consult, on NC currently 3 liters, RUBY, is not on chronic O2  - follow CBC, cultures  ~transition to oral antibiotic therapy     Dyslipidemia  HTN (hypertension)  - continue atorvastatin,  bisoprolol  - monitor BP, HR     Diarrhea   - started 2/25 evening  - multiple episodes  - Cdiff pending  - shakes ordered     History of GI bleed  Anemia  - H/H 14/43.5 > 11.9/38.1 > 12.5/40.8  - trend H/H daily  - if declines further, guiac stool as needed     CKD (chronic kidney disease)  - creat 2.92 > 3.03 > 2.96  - continue sodium bicarb  - gentle hydration  - trend creat  - renally dose meds as needed  - avoid nephrotoxins     Type 2 diabetes mellitus (CMS/HCC)  Essential Tremor  Peripheral neuropathy  Arthritis  - continue gabapentin, primidone,   - accuchecks with SS  - continue lantus     GI ppx: PPI  DVT ppx: lovenox    Code Status; Full    Disposition: Patient is stable to be discharged to home. He was discharged on Augmentin and oxygen 2lpm via NC. He will follow up with his PCP. He was discharged with home health.    Total cumulative time spent in preparation of this discharge including documentation review, coordination of care with the medical team including PT/SW/care coordinators and treating consultants, discussion with patient and pertinent family members and finalization of prescriptions, follow-up appointments, and this discharge summary was approximately 45 minutes.     Pertinent Physical Exam At Time of Discharge  Physical Exam  Vitals reviewed.   Constitutional:       Appearance: Normal appearance.   HENT:      Head: Normocephalic and atraumatic.      Nose: Nose normal.      Mouth/Throat:      Mouth: Mucous membranes are moist.      Pharynx: Oropharynx is clear.   Eyes:      Conjunctiva/sclera: Conjunctivae normal.      Pupils: Pupils are equal, round, and reactive to light.   Cardiovascular:      Rate and Rhythm: Normal rate and regular rhythm.      Pulses: Normal pulses.      Heart sounds: Normal heart sounds.   Pulmonary:      Effort: Pulmonary effort is normal.      Breath sounds: Normal breath sounds.   Abdominal:      General: Bowel sounds are normal.      Palpations: Abdomen is soft.    Musculoskeletal:         General: Normal range of motion.      Cervical back: Normal range of motion and neck supple.      Left lower leg: No edema.      Comments: Above the knee amputation, right   Skin:     General: Skin is warm and dry.   Neurological:      General: No focal deficit present.      Mental Status: He is alert and oriented to person, place, and time.         Outpatient Follow-Up  No future appointments.      Debbie Herrera, APRN-CNP

## 2024-02-28 NOTE — PROGRESS NOTES
Patient medically ready for discharge.  Patient follow up information on discharge instructions.  Patient will be returning home to his assisted living.  Patient will need a stretcher/ambulance back home.  Nursing to arrange.  IMM letter given and explained to patient. Consent/signature obtained and copy given to patient.  Patient is in agreement with discharge plan.    Discharge plan:  Return home- nursing to arrange stretcher/transportation.

## 2024-02-28 NOTE — PROGRESS NOTES
11:00 AM   Per IDT rounds, patient would like home care.  PT/OT ordered.  He will also be returning with oxygen.   [facility] called the  AL and admitting would like patient to return to their skilled unit.  Requesting referral to be sent.  ([facility] sending)  Updated medical team.  Therapy to see patient and [facility] will send notes when completed.

## 2024-02-28 NOTE — CARE PLAN
Patient 87% on room air at rest. 94% on 2 LPM. Patient is an amputee and does not ambulate. Will need oxygen 2 lpm continuous at the assisted living home.

## 2024-02-28 NOTE — PROGRESS NOTES
Physical Therapy    Physical Therapy Evaluation & Treatment    Patient Name: Jarret Bledsoe  MRN: [id number]  Today's Date: 2/28/2024   Time Calculation  Start Time: 1100  Stop Time: 1128  Time Calculation (min): 28 min    Assessment/Plan   PT Assessment  PT Assessment Results: Decreased strength, Decreased range of motion, Decreased endurance, Impaired balance, Decreased mobility, Pain (Muscle spasm and guarding present in the quad.)  Rehab Prognosis: Good  Barriers to Discharge:  (NONE)  Evaluation/Treatment Tolerance: Patient limited by pain, Other (Comment) (Pt fearful of wt bear on the left leg because of pain.,  Pt shaky on the left leg)  Medical Staff Made Aware: Yes  Strengths: Ability to acquire knowledge, Access to adaptive/assistive products, Coping skills, Living arrangement secure, Premorbid level of function, Rehab experience, Support of Caregivers  End of Session Communication: Bedside nurse  Assessment Comment: Pt has adapted to his situation with the AKA.  He does his own pivot transfers to a power wheelchair.  He currently has muscle guarding and spasms in the Quad and the leg is weaker than normal.  Pt Fearful of wt bearing.  Feels the leg will give out.  Pt has been in the hospital for several days without the power chair here.  He will benefit from PT to improve his stregnth and return his confidence to do transfers o nhis own.  End of Session Patient Position: Up in chair   IP OR SWING BED PT PLAN  Inpatient or Swing Bed: Inpatient  PT Plan  Treatment/Interventions: Bed mobility, Transfer training, Balance training, Strengthening, Range of motion, Therapeutic exercise  PT Plan: Skilled PT  PT Discharge Recommendations: Moderate intensity level of continued care  Equipment Recommended upon Discharge: Wheelchair  PT Recommended Transfer Status: Assist x2 (with gait belt for safety with transfers)  PT - OK to Discharge: Yes (Ok to discharge based on completed evaluation and care plan  recommendation.  Ok to discharge to next site of care)      Subjective     General Visit Information:  General  Reason for Referral: Impaired Mobility.  R high AKA, CAP COPD Exacerbation, RSV, Diahrrea.  Referred By: Debby Herrera CNP  Past Medical History Relevant to Rehab: R High AKA,  Family/Caregiver Present: No  Prior to Session Communication: Bedside nurse (Ok to see the patient.  Needs PT evaluation.  Plan to return to LTC Facility skilled.)  Patient Position Received: Bed, 2 rail up, Alarm on  Preferred Learning Style: verbal  General Comment: 64 M admit through ER with c/o cough and SOB.  Admit with pneumonia and possible early Sepsis  Home Living:  Home Living  Type of Home: Assisted living (Lives in an Assisted Living facility with his wife.  That is there home.)  Lives With: Spouse (who also has the same virus that he has)  Home Adaptive Equipment: Wheelchair-power (Grab Bars)  Home Layout: One level  Home Access: Level entry  Bathroom Shower/Tub: Walk-in shower  Bathroom Equipment: Grab bars in shower  Prior Level of Function:  Prior Function Per Pt/Caregiver Report  Level of [place]: Independent with ADLs and functional transfers, Needs assistance with ADLs  Receives Help From: Family  Prior Function Comments: Able to do pivot transfers on his Left leg to his power wc.  Precautions:  Precautions  STEADI Fall Risk Score (The score of 4 or more indicates an increased risk of falling): HIGH  Hearing/Visual Limitations: Glasses for reading  LE Weight Bearing Status: Weight Bearing as Tolerated (Left)  Medical Precautions: Fall precautions, Infection precautions, Oxygen therapy device and L/min  Vital Signs:  Vital Signs  Heart Rate: 85  Heart Rate Source: Monitor  Resp: 18  SpO2: 96 %  BP: 123/59  BP Method: Automatic  Patient Position: Lying    Objective   Pain:  Pain Assessment  Pain Assessment: 0-10  Pain Score: 9  Pain Type: Acute pain, Other (Comment) (Pt has a muscle spasm in the quad.  Painful to  palp and painful when straightening knee or bending knee.)  Pain Location:  (Left   Quad  up the leg.  May include the hip flexor)  Pain Descriptors: Cramping, Aching  Pain Frequency: Other (Comment)  Pain Onset:  (Comes on when straightening the leg or bending the leg.  No pain at rest.,)  Pain Interventions: Other (Comment) (Massage therapy to left anterior quad while seated.  Istructed pt on how to massage muscle himself and to also do long arc quads to facilitate relaxation of the muscle spasm)  Cognition:  Cognition  Overall Cognitive Status: Within Functional Limits  Orientation Level: Oriented X4  Attention: Within Functional Limits  Memory: Within Funtional Limits  Safety/Judgement: Within Functional Limits    General Assessments:  General Observation  General Observation: Pt fearful of using the left leg.  Painful.  Showed pt the spasm in the quad.  Worked on the muscle and had him start LAQ to move the muscle and facilitate blood flow     Activity Tolerance  Endurance: Tolerates less than 10 min exercise, no significant change in vital signs    Sensation  Light Touch: No apparent deficits    Strength  Strength Comments: 4/5 B UE and L LE  Strength  Strength Comments: 4/5 B UE and L LE     Coordination  Movements are Fluid and Coordinated: Yes  Rapid Alternating Movements: Intact    Postural Control  Postural Control: Within Functional Limits  Trunk Control: Good. Able to sit edge of  bed Independently.  No LOB (Sitting edge of bed > 5 min without loss of balance.  Supporting self with hands and without hands.,)    Static Sitting Balance  Static Sitting-Balance Support: Bilateral upper extremity supported, Feet supported  Static Sitting-Level of Assistance: Independent    Static Standing Balance  Static Standing-Balance Support:  (Not able to stand on L leg by Himself.  Leg feels weak and shaky)  Functional Assessments:  Bed Mobility  Bed Mobility: Yes  Bed Mobility 1  Bed Mobility 1: Supine to sitting,  Rolling left  Level of Assistance 1: Set up, Contact guard, Minimum assistance  Bed Mobility Comments 1:  (Rolling to the left.  Using the guard rail to assist.  Weak.  R arm shaky when pushing up from rail.)  Bed Mobility 2  Bed Mobility  2: Scooting  Level of Assistance 2: Minimum assistance  Bed Mobility Comments 2: Pt complained of the quad / thigh hurting    Transfers  Transfer: Yes  Transfer 1  Transfer From 1: Bed to  Transfer to 1: Chair with arms  Technique 1: Sit pivot  Transfer Device 1: Gait belt  Transfer Level of Assistance 1: Moderate assistance, +2  Trials/Comments 1: Pt fearful of putting wt on the left leg.  2 person assist for safety  Extremity/Trunk Assessments:  RUE   RUE : Within Functional Limits  LUE   LUE: Within Functional Limits  RLE   RLE : Exceptions to WFL (AKA up to t he hip)  LLE   LLE : Within Functional Limits  Treatments:  Therapeutic Exercise  Therapeutic Exercise Performed: Yes  Therapeutic Exercise Activity 1: AP's and LAQ's with the left leg         Manual Therapy  Manual Therapy Performed: Yes  Manual Therapy Activity 1: STM and Manual therapy to the left Quad muscle.  Large muscle spasm present.  Painful.  Instructed patient on doing self massage to the left upper leg.    Bed Mobility  Bed Mobility: Yes  Bed Mobility 1  Bed Mobility 1: Supine to sitting, Rolling left  Level of Assistance 1: Set up, Contact guard, Minimum assistance  Bed Mobility Comments 1:  (Rolling to the left.  Using the guard rail to assist.  Weak.  R arm shaky when pushing up from rail.)  Bed Mobility 2  Bed Mobility  2: Scooting  Level of Assistance 2: Minimum assistance  Bed Mobility Comments 2: Pt complained of the quad / thigh hurting    Ambulation/Gait Training  Ambulation/Gait Training Performed: No  Transfers  Transfer: Yes  Transfer 1  Transfer From 1: Bed to  Transfer to 1: Chair with arms  Technique 1: Sit pivot  Transfer Device 1: Gait belt  Transfer Level of Assistance 1: Moderate assistance,  +2  Trials/Comments 1: Pt fearful of putting wt on the left leg.  2 person assist for safety  Outcome Measures:  [facility] Basic Mobility  Turning from your back to your side while in a flat bed without using bedrails: A little  Moving from lying on your back to sitting on the side of a flat bed without using bedrails: A little  Moving to and from bed to chair (including a wheelchair): A lot  Standing up from a chair using your arms (e.g. wheelchair or bedside chair): A lot  To walk in hospital room: Total  Climbing 3-5 steps with railing: Total  Basic Mobility - Total Score: 12    Encounter Problems       Encounter Problems (Active)       Balance       LTG - Patient will maintain balance to allow for safe mobility both sitting and standing.        Start:  02/28/24    Expected End:  02/28/24            LTG - Patient will maintain standing and sitting balance to allow for completion of daily activities transfer to and from wheelchair and or bed Independently.       Start:  02/28/24    Expected End:  02/28/24            STG - Maintains dynamic standing balance with upper extremity support using a standard walker for up to 2 min. with independence       Start:  02/28/24    Expected End:  02/28/24       INTERVENTIONS:  1. Practice standing with minimal support.  2. Educate patient about standing tolerance.  3. Educate patient about independence with gait, transfers, and ADL's.  4. Educate patient about use of assistive device.  5. Educate patient about self-directed care.            Mobility       LTG - Patient will propel wheelchair or power chair Independently household/community distances       Start:  02/28/24    Expected End:  02/28/24               Pain          Transfers       STG - Transfer from bed to chair min a 1 to close supervision       Start:  02/28/24    Expected End:  02/28/24            STG - Patient will perform bed mobility Independently       Start:  02/28/24    Expected End:  02/28/24

## 2024-02-28 NOTE — NURSING NOTE
2/27/24 1950: Assumed care of patient. VSS. C/o pain in rectum where FMS is placed. Minimal drainage noted in FMS. Patient requested for FMS to be removed.     2/27/24 2045: Last drainage output documented on 2/26 at 1400.  Charge RN notified ARACELI LARIOS of patient complaint of FMS, and minimal drainage output. OK to remove FMS at this time per provider.     2/28/24 0100: VSS, No c/o pain. Resting in bed, Bed alarm on.     2/28/24 0431: patient resting in bed, no c/o pain. Call bell in reach. Bed alarm on.

## 2024-02-28 NOTE — CARE PLAN
The patient's goals for the shift include  rest to promote healing    The clinical goals for the shift include patient will tolerate IV Fluids, and remain afebrile    Over the shift, the patient did remained free from injury, and VSS. Tolerated IVF, and remained afebrile. Fecal Management System removed per patient request, as well as a decreased output in FMS. Tolerated Q2 turns.    Problem: Pain  Goal: My pain/discomfort is manageable  Outcome: Progressing     Problem: Pain  Goal: Turns in bed with improved pain control throughout the shift  Outcome: Met     Problem: Pain  Goal: Takes deep breaths with improved pain control throughout the shift  Outcome: Met        Problem: Diabetes  Goal: Vital signs within normal range for age by end of shift  Outcome: Progressing     Problem: Respiratory  Goal: Minimal/no exertional discomfort or dyspnea this shift  Outcome: Met     Problem: Respiratory  Goal: No signs of respiratory distress (eg. Use of accessory muscles. Peds grunting)  Outcome: Met

## 2024-02-29 LAB
BACTERIA BLD CULT: NORMAL
BACTERIA BLD CULT: NORMAL

## 2024-03-03 LAB
ATRIAL RATE: 119 BPM
P AXIS: 50 DEGREES
P OFFSET: 206 MS
P ONSET: 148 MS
PR INTERVAL: 134 MS
Q ONSET: 215 MS
QRS COUNT: 19 BEATS
QRS DURATION: 82 MS
QT INTERVAL: 318 MS
QTC CALCULATION(BAZETT): 447 MS
QTC FREDERICIA: 399 MS
R AXIS: -44 DEGREES
T AXIS: 66 DEGREES
T OFFSET: 374 MS
VENTRICULAR RATE: 119 BPM

## 2024-03-07 NOTE — DOCUMENTATION CLARIFICATION NOTE
"    PATIENT:               CLARENCE SIGALA  ACCT #:                  [id number]  MRN:                       [id number]  :                       1960  ADMIT DATE:       2024 4:27 PM  DISCH DATE:        2024 10:15 PM  RESPONDING PROVIDER #:        [id number]          PROVIDER RESPONSE TEXT:    Sepsis with multi-system organ dysfunction LISA, hypotension    CDI QUERY TEXT:    [facility]_Sepsis    Instruction:  Based on your assessment of the patient and the clinical information, please provide the requested documentation by clicking on the appropriate radio button and enter any additional information if prompted.    Question: Is there a diagnosis indicative of a patient meeting SIRS criteria and with organ dysfunction in the setting of Pneumonia/RSV bronchiolitis infection    When answering this query, please exercise your independent professional judgment. The fact that a question is being asked, does not imply that any particular answer is desired or expected.    The patient's clinical indicators include:  Clinical Information  64 presenting with pneumonia, RSV, acute respiratory failure    Clinical Indicators  \"He states that he began having a bad cough a few days ago and he states yesterday began developing a fever and was sent to another emergency room and evaluated and sent home.  He states that today he got progressively worse and spoke with nursing staff when they evaluated and they are concerned about his vitals and sent him here to be evaluated for further care and treatment.  He is currently on 3 L nasal cannula oxygen at 95% but does not wear oxygen at his home facility\", \"I explained to the patient test results my concerns for possible pneumonia and early sepsis.  The patient was agreeable to being admitted at this time for further care and treatment.  He was admitted to the ICU at this time given his vitals as well as soft blood pressure and my concern for impending septic shock\"    24  TMax  102.8 "   HR  73 -119   RR  20-30    BP  90/62 and 76/48    SpO2 88 percent  Creatinine 2.92    2/25/24  Creatinine 3.03  2/28/24  Creatinine 1.87    Treatment  CC cardiorespiratory and neurologic monitoring, IV antibiotics, IV NS bolus 1000 mls, IV NS bolus 1466 mls, antipyretics, nebulizer treatments, lab monitoring, supplemental oxygen    Risk Factors SNF resident with RSV bronchiolitis, pneumonia, acute respiratory failure, DM, HTN, CKD  Options provided:  -- Sepsis with renal organ dysfunction - LISA  -- Sepsis with  cardiovascular organ dysfunction - hypotension  -- Sepsis with multi-system organ dysfunction LISA, hypotension  -- Sepsis with other organ dysfunction, Please specify additional information below  -- Patient treated for Pneumonia/RSV bronchiolitis without sepsis  -- Other - I will add my own diagnosis  -- Refer to Clinical Documentation Reviewer    Query created by: Nara Sneed on 3/6/2024 10:46 AM      Electronically signed by:  VINAY PEREZ-GIANFRANCO 3/7/2024 6:38 AM